# Patient Record
Sex: MALE | Race: WHITE | Employment: OTHER | URBAN - METROPOLITAN AREA
[De-identification: names, ages, dates, MRNs, and addresses within clinical notes are randomized per-mention and may not be internally consistent; named-entity substitution may affect disease eponyms.]

---

## 2016-11-07 LAB
CREATININE, URINE: NORMAL
MICROALBUMIN/CREAT 24H UR: 1.1 MG/G{CREAT}
MICROALBUMIN/CREAT UR-RTO: NORMAL

## 2017-04-04 PROBLEM — I95.1 ORTHOSTATIC HYPOTENSION: Status: ACTIVE | Noted: 2017-04-04

## 2017-07-03 PROBLEM — J45.30 MILD PERSISTENT ASTHMA WITHOUT COMPLICATION: Status: ACTIVE | Noted: 2017-07-03

## 2017-12-21 ENCOUNTER — HOSPITAL ENCOUNTER (OUTPATIENT)
Age: 77
Discharge: HOME | End: 2017-12-21
Payer: MEDICARE

## 2017-12-27 ENCOUNTER — TELEPHONE (OUTPATIENT)
Dept: UROLOGY | Age: 77
End: 2017-12-27

## 2018-01-15 ENCOUNTER — HOSPITAL ENCOUNTER (OUTPATIENT)
Age: 78
Discharge: HOME | End: 2018-01-15
Payer: MEDICARE

## 2018-03-27 ENCOUNTER — OFFICE VISIT (OUTPATIENT)
Dept: UROLOGY | Age: 78
End: 2018-03-27
Payer: MEDICARE

## 2018-03-27 VITALS
BODY MASS INDEX: 34.4 KG/M2 | HEART RATE: 71 BPM | SYSTOLIC BLOOD PRESSURE: 131 MMHG | DIASTOLIC BLOOD PRESSURE: 82 MMHG | WEIGHT: 227 LBS | HEIGHT: 68 IN | TEMPERATURE: 97.5 F

## 2018-03-27 DIAGNOSIS — N13.8 BPH WITH OBSTRUCTION/LOWER URINARY TRACT SYMPTOMS: ICD-10-CM

## 2018-03-27 DIAGNOSIS — R97.20 ELEVATED PSA: Primary | ICD-10-CM

## 2018-03-27 DIAGNOSIS — N40.1 BPH WITH OBSTRUCTION/LOWER URINARY TRACT SYMPTOMS: ICD-10-CM

## 2018-03-27 PROCEDURE — G8482 FLU IMMUNIZE ORDER/ADMIN: HCPCS | Performed by: UROLOGY

## 2018-03-27 PROCEDURE — 99204 OFFICE O/P NEW MOD 45 MIN: CPT | Performed by: UROLOGY

## 2018-03-27 PROCEDURE — 1036F TOBACCO NON-USER: CPT | Performed by: UROLOGY

## 2018-03-27 PROCEDURE — 4040F PNEUMOC VAC/ADMIN/RCVD: CPT | Performed by: UROLOGY

## 2018-03-27 PROCEDURE — G8598 ASA/ANTIPLAT THER USED: HCPCS | Performed by: UROLOGY

## 2018-03-27 PROCEDURE — G8417 CALC BMI ABV UP PARAM F/U: HCPCS | Performed by: UROLOGY

## 2018-03-27 PROCEDURE — G8427 DOCREV CUR MEDS BY ELIG CLIN: HCPCS | Performed by: UROLOGY

## 2018-03-27 PROCEDURE — 1123F ACP DISCUSS/DSCN MKR DOCD: CPT | Performed by: UROLOGY

## 2018-03-27 RX ORDER — TAMSULOSIN HYDROCHLORIDE 0.4 MG/1
CAPSULE ORAL
Qty: 90 CAPSULE | Refills: 3 | Status: SHIPPED | OUTPATIENT
Start: 2018-03-27 | End: 2019-03-24 | Stop reason: SDUPTHER

## 2018-03-27 ASSESSMENT — ENCOUNTER SYMPTOMS
NAUSEA: 0
EYE PAIN: 0
BACK PAIN: 0
COUGH: 1
ABDOMINAL PAIN: 0
VOMITING: 0
DOUBLE VISION: 0
WHEEZING: 0
BLURRED VISION: 0
EYE REDNESS: 0
SHORTNESS OF BREATH: 1

## 2018-03-27 NOTE — PROGRESS NOTES
[facility]  [facility] - [place]  [address]  [address]  [address] [place] [id number]  Dept: [phone]  Dept Fax: [phone]    [facility] Urology Office Note - New patient    Patient:  Yue Davenport  YOB: 1940  Date: 3/27/2018    The patient is a 69 y.o. male who presents today for evaluation of the following problems:   Chief Complaint   Patient presents with    Benign Prostatic Hypertrophy     NP - ref by PCP for bph; pt denies any urologic signs and symptoms     referred by MICHEAL Licea.      HPI  Here for elevated PSA and BPH. He had an elevated PSA several years ago of 9, and had a negative biopsy. PSA came down to 3.98. He had an episode of retention a few years ago, and has been on Flomax and feels like he's doing well. He recently was found to have an elevated PSA of 4.44. No hematuria, no dysuria. No previous prostate infections. He tolerates the Flomax well. (Patient's old records have been requested, reviewed and summarized in today's note.)    Summary of old records: N/A    Additional History: N/A    Procedures Today: N/A    Last several PSA's:  No results found for: PSA  Last total testosterone:  No results found for: TESTOSTERONE  Urinalysis today:  No results found for this visit on 03/27/18. AUA Symptom Score (3/27/2018):   INCOMPLETE EMPTYING: How often have you had the sensation of not emptying your bladder?: Not at all  FREQUENCY: How often do you have to urinate less than every two hours?: Less than 1 to 5 times  INTERMITTENCY: How often have you found you stopped and started again several times when you urinated?: Not at all  URGENCY: How often have you found it difficult to postpone urination?: Not at all  WEAK STREAM: How often have you had a weak urinary stream?: Not at all  STRAINING: How often have you had to strain to start  urination?: Not at all  NOCTURIA: How many times did you typically get up at night to uriniate?: 1

## 2018-03-27 NOTE — PROGRESS NOTES
Review of Systems   Constitutional: Negative for chills, fever and malaise/fatigue. HENT: Positive for hearing loss. Negative for ear pain. Eyes: Negative for blurred vision, double vision, pain and redness. Respiratory: Positive for cough and shortness of breath. Negative for wheezing. Cardiovascular: Negative for chest pain, palpitations and leg swelling. Gastrointestinal: Negative for abdominal pain, nausea and vomiting. Genitourinary: Negative for dysuria, flank pain, frequency, hematuria and urgency. Musculoskeletal: Negative for back pain, falls, myalgias and neck pain. Skin: Negative for itching and rash. Neurological: Positive for headaches. Negative for dizziness and tremors. Endo/Heme/Allergies: Bruises/bleeds easily. Psychiatric/Behavioral: Positive for memory loss. The patient is nervous/anxious.

## 2018-06-21 PROBLEM — F32.5 MAJOR DEPRESSIVE DISORDER WITH SINGLE EPISODE, IN FULL REMISSION (HCC): Status: ACTIVE | Noted: 2018-06-21

## 2018-06-21 PROBLEM — Z99.89 OSA ON CPAP: Status: ACTIVE | Noted: 2018-06-21

## 2018-06-21 PROBLEM — G47.33 OSA ON CPAP: Status: ACTIVE | Noted: 2018-06-21

## 2018-12-26 ENCOUNTER — TELEPHONE (OUTPATIENT)
Dept: PRIMARY CARE CLINIC | Age: 78
End: 2018-12-26

## 2018-12-28 RX ORDER — FLUTICASONE FUROATE AND VILANTEROL 200; 25 UG/1; UG/1
1 POWDER RESPIRATORY (INHALATION) DAILY
COMMUNITY
End: 2019-01-04 | Stop reason: ALTCHOICE

## 2019-01-04 RX ORDER — LEVOTHYROXINE SODIUM 137 UG/1
137 TABLET ORAL DAILY
Qty: 90 TABLET | Refills: 1 | Status: SHIPPED | OUTPATIENT
Start: 2019-01-04 | End: 2019-07-17 | Stop reason: SDUPTHER

## 2019-01-04 RX ORDER — OMEPRAZOLE 40 MG/1
40 CAPSULE, DELAYED RELEASE ORAL DAILY
Qty: 90 CAPSULE | Refills: 1 | Status: SHIPPED | OUTPATIENT
Start: 2019-01-04 | End: 2019-08-20 | Stop reason: ALTCHOICE

## 2019-03-24 DIAGNOSIS — N40.1 BPH WITH OBSTRUCTION/LOWER URINARY TRACT SYMPTOMS: ICD-10-CM

## 2019-03-24 DIAGNOSIS — N13.8 BPH WITH OBSTRUCTION/LOWER URINARY TRACT SYMPTOMS: ICD-10-CM

## 2019-03-26 RX ORDER — TAMSULOSIN HYDROCHLORIDE 0.4 MG/1
CAPSULE ORAL
Qty: 90 CAPSULE | Refills: 2 | Status: SHIPPED | OUTPATIENT
Start: 2019-03-26 | End: 2020-01-28 | Stop reason: SDUPTHER

## 2019-03-28 ENCOUNTER — TELEPHONE (OUTPATIENT)
Dept: UROLOGY | Age: 79
End: 2019-03-28

## 2019-04-12 ENCOUNTER — TELEPHONE (OUTPATIENT)
Dept: PRIMARY CARE CLINIC | Age: 79
End: 2019-04-12

## 2019-04-12 RX ORDER — FLUTICASONE FUROATE AND VILANTEROL 200; 25 UG/1; UG/1
200 POWDER RESPIRATORY (INHALATION) ONCE
Qty: 1 EACH | Refills: 0 | COMMUNITY
Start: 2019-04-12 | End: 2019-04-12

## 2019-04-12 NOTE — TELEPHONE ENCOUNTER
Patient's daughter Cassie Gross called and asked for a Breo 200mcg/25mcg. Patient's grandson Ruba Young will  sample today. Patient's daughter scheduled appointment for patient as well.

## 2019-05-10 DIAGNOSIS — G89.29 CHRONIC NONINTRACTABLE HEADACHE, UNSPECIFIED HEADACHE TYPE: ICD-10-CM

## 2019-05-10 DIAGNOSIS — R51.9 CHRONIC NONINTRACTABLE HEADACHE, UNSPECIFIED HEADACHE TYPE: ICD-10-CM

## 2019-05-10 RX ORDER — BUTALBITAL, ACETAMINOPHEN, CAFFEINE AND CODEINE PHOSPHATE 50; 325; 40; 30 MG/1; MG/1; MG/1; MG/1
1 CAPSULE ORAL EVERY 8 HOURS PRN
Qty: 60 CAPSULE | Refills: 1 | Status: SHIPPED | OUTPATIENT
Start: 2019-05-10 | End: 2019-10-27 | Stop reason: SDUPTHER

## 2019-05-10 NOTE — TELEPHONE ENCOUNTER
Last appt 6/21/18. Sofy's pt. Has appt coming up on 6/18/19 with Damian Tony. Asking for enough just to hold him until his appt.

## 2019-06-28 ENCOUNTER — OFFICE VISIT (OUTPATIENT)
Dept: PRIMARY CARE CLINIC | Age: 79
End: 2019-06-28
Payer: MEDICARE

## 2019-06-28 ENCOUNTER — HOSPITAL ENCOUNTER (OUTPATIENT)
Age: 79
Setting detail: SPECIMEN
Discharge: HOME | End: 2019-06-28
Payer: MEDICARE

## 2019-06-28 VITALS
DIASTOLIC BLOOD PRESSURE: 78 MMHG | SYSTOLIC BLOOD PRESSURE: 112 MMHG | OXYGEN SATURATION: 94 % | BODY MASS INDEX: 35.35 KG/M2 | HEART RATE: 58 BPM | WEIGHT: 231.8 LBS

## 2019-06-28 DIAGNOSIS — I10 BENIGN ESSENTIAL HYPERTENSION: ICD-10-CM

## 2019-06-28 DIAGNOSIS — I10 BENIGN ESSENTIAL HYPERTENSION: Primary | ICD-10-CM

## 2019-06-28 DIAGNOSIS — E03.9 HYPOTHYROIDISM, UNSPECIFIED TYPE: ICD-10-CM

## 2019-06-28 LAB
ANION GAP SERPL CALCULATED.3IONS-SCNC: 10 MMOL/L (ref 9–17)
BUN BLDV-MCNC: 14 MG/DL (ref 8–23)
BUN/CREAT BLD: ABNORMAL (ref 9–20)
CALCIUM SERPL-MCNC: 9.4 MG/DL (ref 8.6–10.4)
CHLORIDE BLD-SCNC: 101 MMOL/L (ref 98–107)
CO2: 27 MMOL/L (ref 20–31)
CREAT SERPL-MCNC: 0.9 MG/DL (ref 0.7–1.2)
GFR AFRICAN AMERICAN: >60 ML/MIN
GFR NON-AFRICAN AMERICAN: >60 ML/MIN
GFR SERPL CREATININE-BSD FRML MDRD: ABNORMAL ML/MIN/{1.73_M2}
GFR SERPL CREATININE-BSD FRML MDRD: ABNORMAL ML/MIN/{1.73_M2}
GLUCOSE BLD-MCNC: 136 MG/DL (ref 70–99)
POTASSIUM SERPL-SCNC: 4.6 MMOL/L (ref 3.7–5.3)
SODIUM BLD-SCNC: 138 MMOL/L (ref 135–144)
TSH SERPL DL<=0.05 MIU/L-ACNC: 2.25 MIU/L (ref 0.3–5)

## 2019-06-28 PROCEDURE — 1036F TOBACCO NON-USER: CPT | Performed by: PHYSICIAN ASSISTANT

## 2019-06-28 PROCEDURE — G8599 NO ASA/ANTIPLAT THER USE RNG: HCPCS | Performed by: PHYSICIAN ASSISTANT

## 2019-06-28 PROCEDURE — G8427 DOCREV CUR MEDS BY ELIG CLIN: HCPCS | Performed by: PHYSICIAN ASSISTANT

## 2019-06-28 PROCEDURE — 1123F ACP DISCUSS/DSCN MKR DOCD: CPT | Performed by: PHYSICIAN ASSISTANT

## 2019-06-28 PROCEDURE — 99214 OFFICE O/P EST MOD 30 MIN: CPT | Performed by: PHYSICIAN ASSISTANT

## 2019-06-28 PROCEDURE — G8417 CALC BMI ABV UP PARAM F/U: HCPCS | Performed by: PHYSICIAN ASSISTANT

## 2019-06-28 PROCEDURE — 4040F PNEUMOC VAC/ADMIN/RCVD: CPT | Performed by: PHYSICIAN ASSISTANT

## 2019-06-28 RX ORDER — SERTRALINE HYDROCHLORIDE 100 MG/1
TABLET, FILM COATED ORAL
Qty: 90 TABLET | Refills: 1 | Status: SHIPPED | OUTPATIENT
Start: 2019-06-28 | End: 2019-12-02 | Stop reason: SDUPTHER

## 2019-06-28 RX ORDER — CARVEDILOL 6.25 MG/1
1 TABLET ORAL 2 TIMES DAILY
Refills: 3 | COMMUNITY
Start: 2019-05-29

## 2019-06-28 RX ORDER — MIDODRINE HYDROCHLORIDE 5 MG/1
TABLET ORAL
Qty: 270 TABLET | Refills: 1 | Status: SHIPPED | OUTPATIENT
Start: 2019-06-28 | End: 2019-08-20 | Stop reason: ALTCHOICE

## 2019-06-28 RX ORDER — OMEPRAZOLE 40 MG/1
40 CAPSULE, DELAYED RELEASE ORAL DAILY
Qty: 90 CAPSULE | Refills: 1 | Status: CANCELLED | OUTPATIENT
Start: 2019-06-28

## 2019-06-28 ASSESSMENT — ENCOUNTER SYMPTOMS
VOICE CHANGE: 0
CONSTIPATION: 0
NAUSEA: 0
SHORTNESS OF BREATH: 0
DIARRHEA: 0
COUGH: 0
TROUBLE SWALLOWING: 0
BACK PAIN: 0
BLOOD IN STOOL: 0
CHEST TIGHTNESS: 0
WHEEZING: 0
EYE PAIN: 0
VOMITING: 0
ABDOMINAL PAIN: 0

## 2019-06-28 NOTE — PROGRESS NOTES
[address] [facility] PR[facility]  [id number] [facility]  Dept: [phone]    Ramy Green is a 67 y.o. male who presents today for his medical conditions/complaintsas noted below. Chief Complaint   Patient presents with    Medication Check     general check up. needs refills. feeling well. no concerns. HPI:     HPI  CPAP machine only 4 hours at night  Feels good except HAs mostly in am  Bowels and bladder no problems, no CP or SOB. Takes his Pippa Rocker once day only. Getting samples from [facility] in place of [facility].     LDL Cholesterol (mg/dL)   Date Value   02/01/2016 160 (H)   01/08/2013 129 (H)     LDL Calculated (mg/dL)   Date Value   11/28/2017 122   11/05/2016 193 (A)       (goal LDL is <100)   AST (U/L)   Date Value   08/24/2016 32     ALT (U/L)   Date Value   08/24/2016 21     BUN (mg/dL)   Date Value   08/30/2016 12     BP Readings from Last 3 Encounters:   06/28/19 112/78   06/21/18 138/80   03/27/18 131/82          (goal 120/80)    Past Medical History:   Diagnosis Date    Depression     Hyperlipidemia     Hypertension     Hypothyroidism       Past Surgical History:   Procedure Laterality Date    CORONARY ANGIOPLASTY WITH STENT PLACEMENT  2013    EYE SURGERY      as a child       Family History   Problem Relation Age of Onset    Cancer Mother         breast    Diabetes Brother     Heart Disease Brother     High Blood Pressure Brother     Diabetes Maternal Uncle     Heart Disease Maternal Uncle     Cancer Other         throat       Social History     Tobacco Use    Smoking status: Never Smoker    Smokeless tobacco: Never Used   Substance Use Topics    Alcohol use: No      Current Outpatient Medications   Medication Sig Dispense Refill    carvedilol (COREG) 6.25 MG tablet Take 1 tablet by mouth daily  3    sertraline (ZOLOFT) 100 MG tablet TAKE ONE TABLET BY MOUTH ONE TIME DAILY 90 tablet 1    midodrine (PROAMATINE) 5 MG tablet TAKE ONE TABLET BY MOUTH THREE TIMES DAILY WITH FOOD 270 tablet 1    butalbital-acetaminophen-caffeine-codeine (FIORICET WITH CODEINE) -38-30 MG per capsule Take 1 capsule by mouth every 8 hours as needed for Headaches for up to 60 days. 60 capsule 1    tamsulosin (FLOMAX) 0.4 MG capsule TAKE 1 CAPSULE EVERY EVENING 90 capsule 2    omeprazole (PRILOSEC) 40 MG delayed release capsule Take 1 capsule by mouth daily 90 capsule 1    levothyroxine (SYNTHROID) 137 MCG tablet Take 1 tablet by mouth Daily 90 tablet 1    mometasone-formoterol (DULERA) 100-5 MCG/ACT inhaler Inhale 2 puffs into the lungs 2 times daily 3 Inhaler 1    Omega-3 Fatty Acids (FISH OIL OMEGA-3 PO) Take by mouth      amLODIPine (NORVASC) 5 MG tablet Take 1 tablet by mouth daily (Patient taking differently: Take 5 mg by mouth 2 times daily ) 90 tablet 1    clopidogrel (PLAVIX) 75 MG tablet Take 1 tablet by mouth daily 90 tablet 1    losartan (COZAAR) 100 MG tablet Take 1 tablet by mouth 2 times daily 180 tablet 1    hydrochlorothiazide (MICROZIDE) 12.5 MG capsule Take 1 capsule by mouth as needed (edema) 30 capsule 5    vitamin B-12 (CYANOCOBALAMIN) 1000 MCG tablet Take 1 tablet by mouth daily 90 tablet 1    vitamin D (CHOLECALCIFEROL) 1000 UNIT TABS tablet Take 1 tablet by mouth daily 30 tablet 11    Multiple Vitamins-Minerals (ONE DAILY MENS) TABS Take 1 tablet by mouth daily       No current facility-administered medications for this visit.       Allergies   Allergen Reactions    Amiodarone Other (See Comments)     hallucinations       Health Maintenance   Topic Date Due    DTaP/Tdap/Td vaccine (1 - Tdap) 03/08/1960    Shingles Vaccine (2 of 3) 12/27/2012    TSH testing  11/28/2018    Potassium monitoring  11/28/2018    Creatinine monitoring  11/28/2018    Annual Wellness Visit (AWV)  06/21/2019    Flu vaccine (Season Ended) 09/01/2019    Pneumococcal 65+ years Vaccine  Completed       Subjective:      Review of Systems Constitutional: Negative for activity change, appetite change, chills, fatigue, fever and unexpected weight change. HENT: Negative for dental problem, hearing loss, trouble swallowing and voice change. Eyes: Negative for pain and visual disturbance. Respiratory: Negative for cough, chest tightness, shortness of breath and wheezing. Cardiovascular: Negative for chest pain, palpitations and leg swelling. Gastrointestinal: Negative for abdominal pain, blood in stool, constipation, diarrhea, nausea and vomiting. Endocrine: Negative for cold intolerance, heat intolerance, polydipsia, polyphagia and polyuria. Genitourinary: Negative for discharge, dysuria, flank pain, frequency, hematuria, penile pain, penile swelling, scrotal swelling, testicular pain and urgency. Musculoskeletal: Negative for arthralgias, back pain, joint swelling and myalgias. Neurological: Positive for headaches. Negative for dizziness, syncope, speech difficulty and light-headedness. Hematological: Negative for adenopathy. Does not bruise/bleed easily. Psychiatric/Behavioral: Negative for dysphoric mood and sleep disturbance. The patient is not nervous/anxious. Objective:     /78   Pulse 58   Wt 231 lb 12.8 oz (105.1 kg)   SpO2 94%   BMI 35.35 kg/m²   Physical Exam   Constitutional: He is oriented to person, place, and time. He appears well-developed and well-nourished. HENT:   Right Ear: External ear normal.   Left Ear: External ear normal.   Nose: Nose normal.   Mouth/Throat: Oropharynx is clear and moist. No oropharyngeal exudate. Eyes: Pupils are equal, round, and reactive to light. Conjunctivae are normal. Right eye exhibits no discharge. Left eye exhibits no discharge. Neck: Normal range of motion. No thyromegaly present. Cardiovascular: Normal rate, regular rhythm and normal heart sounds. Exam reveals no gallop and no friction rub. No murmur heard.   Pulmonary/Chest: Effort normal and breath sounds normal. No respiratory distress. He has no wheezes. He has no rales. He exhibits no tenderness. Abdominal: Soft. Bowel sounds are normal. He exhibits no distension and no mass. There is no tenderness. There is no rebound and no guarding. No hernia. Musculoskeletal: Normal range of motion. He exhibits no edema. Neurological: He is alert and oriented to person, place, and time. He displays normal reflexes. He exhibits normal muscle tone. Coordination normal.   Skin: Skin is warm. Capillary refill takes less than 2 seconds. No rash noted. Psychiatric: He has a normal mood and affect. Vitals reviewed. Assessment:       Diagnosis Orders   1. Benign essential hypertension  Basic Metabolic Panel   2. Hypothyroidism, unspecified type  TSH        Plan:    Call back on the Breo dose which he takes in place of [place]. Call back if he is using the HCTZ  Have BW here today  Refills sent    Return in about 6 months (around 12/28/2019) for recheck --1/2 hour and AWV. Orders Placed This Encounter   Procedures    Basic Metabolic Panel     Standing Status:   Future     Number of Occurrences:   1     Standing Expiration Date:   6/28/2020    TSH     Standing Status:   Future     Number of Occurrences:   1     Standing Expiration Date:   6/28/2020     Orders Placed This Encounter   Medications    sertraline (ZOLOFT) 100 MG tablet     Sig: TAKE ONE TABLET BY MOUTH ONE TIME DAILY     Dispense:  90 tablet     Refill:  1    midodrine (PROAMATINE) 5 MG tablet     Sig: TAKE ONE TABLET BY MOUTH THREE TIMES DAILY WITH FOOD     Dispense:  270 tablet     Refill:  1       Patient given educationalmaterials - see patient instructions. Discussed use, benefit, and side effectsof prescribed medications. All patient questions answered. Pt voiced understanding. Reviewed health maintenance. Instructed to continue current medications, diet andexercise. Patient agreed with treatment plan. Follow up as directed. Electronicallysigned by Chris Morris PA-C on 6/28/2019 at 2:00 PM

## 2019-07-01 ENCOUNTER — TELEPHONE (OUTPATIENT)
Dept: PRIMARY CARE CLINIC | Age: 79
End: 2019-07-01

## 2019-07-01 DIAGNOSIS — R73.01 ELEVATED FASTING GLUCOSE: Primary | ICD-10-CM

## 2019-07-01 RX ORDER — FLUTICASONE FUROATE AND VILANTEROL 200; 25 UG/1; UG/1
POWDER RESPIRATORY (INHALATION)
Qty: 1 EACH | Refills: 0 | Status: SHIPPED
Start: 2019-07-01 | End: 2021-06-30 | Stop reason: SDUPTHER

## 2019-07-01 RX ORDER — FLUTICASONE FUROATE AND VILANTEROL 200; 25 UG/1; UG/1
POWDER RESPIRATORY (INHALATION)
Qty: 1 EACH | Refills: 0
Start: 2019-07-01 | End: 2019-07-01 | Stop reason: DRUGHIGH

## 2019-08-12 ENCOUNTER — TELEPHONE (OUTPATIENT)
Dept: PRIMARY CARE CLINIC | Age: 79
End: 2019-08-12

## 2019-08-12 NOTE — TELEPHONE ENCOUNTER
L/m for Jenn to call back regarding when Janel Lamas should get his BW done, he should get them both done now.  He also needs to be fasting for the glucose test.

## 2019-08-20 ENCOUNTER — HOSPITAL ENCOUNTER (OUTPATIENT)
Age: 79
Setting detail: SPECIMEN
Discharge: HOME | End: 2019-08-20
Payer: MEDICARE

## 2019-08-20 ENCOUNTER — OFFICE VISIT (OUTPATIENT)
Dept: PRIMARY CARE CLINIC | Age: 79
End: 2019-08-20
Payer: MEDICARE

## 2019-08-20 VITALS
BODY MASS INDEX: 35.26 KG/M2 | DIASTOLIC BLOOD PRESSURE: 82 MMHG | HEART RATE: 70 BPM | SYSTOLIC BLOOD PRESSURE: 124 MMHG | OXYGEN SATURATION: 96 % | WEIGHT: 231.2 LBS | TEMPERATURE: 97.9 F

## 2019-08-20 DIAGNOSIS — I10 BENIGN ESSENTIAL HYPERTENSION: ICD-10-CM

## 2019-08-20 DIAGNOSIS — G47.33 OSA ON CPAP: ICD-10-CM

## 2019-08-20 DIAGNOSIS — R73.01 ELEVATED FASTING GLUCOSE: ICD-10-CM

## 2019-08-20 DIAGNOSIS — Z99.89 OSA ON CPAP: ICD-10-CM

## 2019-08-20 DIAGNOSIS — R05.9 COUGH: Primary | ICD-10-CM

## 2019-08-20 DIAGNOSIS — F32.5 MAJOR DEPRESSIVE DISORDER WITH SINGLE EPISODE, IN FULL REMISSION (HCC): ICD-10-CM

## 2019-08-20 DIAGNOSIS — J45.30 MILD PERSISTENT ASTHMA WITHOUT COMPLICATION: ICD-10-CM

## 2019-08-20 LAB — GLUCOSE FASTING: 118 MG/DL (ref 70–99)

## 2019-08-20 PROCEDURE — 99213 OFFICE O/P EST LOW 20 MIN: CPT | Performed by: PHYSICIAN ASSISTANT

## 2019-08-20 PROCEDURE — 4040F PNEUMOC VAC/ADMIN/RCVD: CPT | Performed by: PHYSICIAN ASSISTANT

## 2019-08-20 PROCEDURE — G8599 NO ASA/ANTIPLAT THER USE RNG: HCPCS | Performed by: PHYSICIAN ASSISTANT

## 2019-08-20 PROCEDURE — G8427 DOCREV CUR MEDS BY ELIG CLIN: HCPCS | Performed by: PHYSICIAN ASSISTANT

## 2019-08-20 PROCEDURE — 1036F TOBACCO NON-USER: CPT | Performed by: PHYSICIAN ASSISTANT

## 2019-08-20 PROCEDURE — G8417 CALC BMI ABV UP PARAM F/U: HCPCS | Performed by: PHYSICIAN ASSISTANT

## 2019-08-20 PROCEDURE — 1123F ACP DISCUSS/DSCN MKR DOCD: CPT | Performed by: PHYSICIAN ASSISTANT

## 2019-08-20 RX ORDER — BUTALBITAL, ACETAMINOPHEN, CAFFEINE AND CODEINE PHOSPHATE 50; 325; 40; 30 MG/1; MG/1; MG/1; MG/1
CAPSULE ORAL
Refills: 1 | COMMUNITY
Start: 2019-07-31 | End: 2019-10-28 | Stop reason: SDUPTHER

## 2019-08-20 ASSESSMENT — ENCOUNTER SYMPTOMS
COUGH: 1
SORE THROAT: 0

## 2019-08-21 DIAGNOSIS — R05.9 COUGH: ICD-10-CM

## 2019-08-21 LAB
ESTIMATED AVERAGE GLUCOSE: 137 MG/DL
HBA1C MFR BLD: 6.4 % (ref 4–6)

## 2019-08-30 DIAGNOSIS — F43.21 GRIEF REACTION: Primary | ICD-10-CM

## 2019-08-30 DIAGNOSIS — F41.9 ANXIETY: ICD-10-CM

## 2019-08-30 RX ORDER — ALPRAZOLAM 0.5 MG/1
TABLET ORAL
Qty: 15 TABLET | Refills: 0 | Status: SHIPPED | OUTPATIENT
Start: 2019-08-30 | End: 2019-09-30

## 2019-09-11 ENCOUNTER — OFFICE VISIT (OUTPATIENT)
Dept: PRIMARY CARE CLINIC | Age: 79
End: 2019-09-11
Payer: MEDICARE

## 2019-09-11 VITALS
BODY MASS INDEX: 35.38 KG/M2 | OXYGEN SATURATION: 96 % | DIASTOLIC BLOOD PRESSURE: 84 MMHG | WEIGHT: 232 LBS | HEART RATE: 71 BPM | SYSTOLIC BLOOD PRESSURE: 134 MMHG

## 2019-09-11 DIAGNOSIS — I10 BENIGN ESSENTIAL HYPERTENSION: ICD-10-CM

## 2019-09-11 DIAGNOSIS — I25.10 CORONARY ARTERY DISEASE INVOLVING NATIVE HEART WITHOUT ANGINA PECTORIS, UNSPECIFIED VESSEL OR LESION TYPE: ICD-10-CM

## 2019-09-11 DIAGNOSIS — E11.9 TYPE 2 DIABETES MELLITUS WITHOUT COMPLICATION, WITHOUT LONG-TERM CURRENT USE OF INSULIN (HCC): Primary | ICD-10-CM

## 2019-09-11 DIAGNOSIS — R20.0 LOSS OF FEELING OR SENSATION: ICD-10-CM

## 2019-09-11 LAB
CREATININE URINE POCT: 300
MICROALBUMIN/CREAT 24H UR: 30 MG/G{CREAT}
MICROALBUMIN/CREAT UR-RTO: <30

## 2019-09-11 PROCEDURE — G8599 NO ASA/ANTIPLAT THER USE RNG: HCPCS | Performed by: PHYSICIAN ASSISTANT

## 2019-09-11 PROCEDURE — G8417 CALC BMI ABV UP PARAM F/U: HCPCS | Performed by: PHYSICIAN ASSISTANT

## 2019-09-11 PROCEDURE — 4040F PNEUMOC VAC/ADMIN/RCVD: CPT | Performed by: PHYSICIAN ASSISTANT

## 2019-09-11 PROCEDURE — 1123F ACP DISCUSS/DSCN MKR DOCD: CPT | Performed by: PHYSICIAN ASSISTANT

## 2019-09-11 PROCEDURE — 82044 UR ALBUMIN SEMIQUANTITATIVE: CPT | Performed by: PHYSICIAN ASSISTANT

## 2019-09-11 PROCEDURE — 99214 OFFICE O/P EST MOD 30 MIN: CPT | Performed by: PHYSICIAN ASSISTANT

## 2019-09-11 PROCEDURE — 1036F TOBACCO NON-USER: CPT | Performed by: PHYSICIAN ASSISTANT

## 2019-09-11 PROCEDURE — G8427 DOCREV CUR MEDS BY ELIG CLIN: HCPCS | Performed by: PHYSICIAN ASSISTANT

## 2019-09-11 RX ORDER — ATORVASTATIN CALCIUM 40 MG/1
40 TABLET, FILM COATED ORAL DAILY
Qty: 30 TABLET | Refills: 1 | Status: SHIPPED | OUTPATIENT
Start: 2019-09-11 | End: 2019-12-02 | Stop reason: SDUPTHER

## 2019-09-11 RX ORDER — BLOOD PRESSURE TEST KIT
KIT MISCELLANEOUS
Qty: 100 EACH | Refills: 11 | Status: SHIPPED | OUTPATIENT
Start: 2019-09-11

## 2019-09-11 RX ORDER — LANCETS 30 GAUGE
EACH MISCELLANEOUS
Qty: 100 EACH | Refills: 11 | Status: SHIPPED | OUTPATIENT
Start: 2019-09-11 | End: 2021-06-30 | Stop reason: ALTCHOICE

## 2019-09-11 RX ORDER — FLASH GLUCOSE SENSOR
KIT MISCELLANEOUS
Qty: 6 EACH | Refills: 3 | Status: SHIPPED | OUTPATIENT
Start: 2019-09-11 | End: 2021-03-29

## 2019-09-11 RX ORDER — FLASH GLUCOSE SCANNING READER
EACH MISCELLANEOUS
Qty: 1 DEVICE | Refills: 0 | Status: SHIPPED | OUTPATIENT
Start: 2019-09-11 | End: 2021-06-30 | Stop reason: SDUPTHER

## 2019-09-11 ASSESSMENT — ENCOUNTER SYMPTOMS
SHORTNESS OF BREATH: 0
CHEST TIGHTNESS: 0
NAUSEA: 0
COUGH: 0
SORE THROAT: 0
EYE DISCHARGE: 0
VOMITING: 0
EYE PAIN: 0

## 2019-09-11 NOTE — PROGRESS NOTES
Potassium monitoring  06/28/2020    Creatinine monitoring  06/28/2020    Pneumococcal 65+ years Vaccine  Completed       Subjective:      Review of Systems   Constitutional: Positive for fatigue. Negative for activity change, appetite change and fever. HENT: Negative for congestion and sore throat. Eyes: Negative for pain, discharge and visual disturbance. Respiratory: Negative for cough, chest tightness and shortness of breath. Cardiovascular: Negative for chest pain and palpitations. Gastrointestinal: Negative for nausea and vomiting. Endocrine: Negative for polydipsia and polyphagia. Neurological: Negative for dizziness and numbness. Psychiatric/Behavioral: Negative for sleep disturbance. Objective:     /84   Pulse 71   Wt 232 lb (105.2 kg)   SpO2 96%   BMI 35.38 kg/m²   Physical Exam   Constitutional: He is oriented to person, place, and time. Neck: No thyromegaly present. Cardiovascular: Normal rate, regular rhythm and normal heart sounds. Pulses:       Dorsalis pedis pulses are 2+ on the right side, and 2+ on the left side. Pulmonary/Chest: Effort normal and breath sounds normal.   Musculoskeletal: He exhibits no edema. Right foot: There is normal range of motion and no deformity. Left foot: There is normal range of motion and no deformity. Feet:   Right Foot:   Protective Sensation: 7 sites tested. 5 sites sensed. Skin Integrity: Negative for ulcer, blister, skin breakdown, erythema, warmth or callus. Left Foot:   Protective Sensation: 7 sites tested. 4 sites sensed. Skin Integrity: Negative for ulcer, blister, skin breakdown, erythema, warmth, callus or dry skin. Neurological: He is alert and oriented to person, place, and time. Psychiatric: He has a normal mood and affect. Vitals reviewed. Assessment:       Diagnosis Orders   1.  Type 2 diabetes mellitus without complication, without long-term current use of insulin ([facility])  POCT For any brand insurance will cover. TEST BID     Dispense:  100 each     Refill:  11    Blood Glucose Monitoring Suppl MISC     Sig: For any brand insurance will cover. GLUCOMETER. TEST BID. Dispense:  1 each     Refill:  0    blood glucose test strips (ASCENSIA AUTODISC VI;ONE TOUCH ULTRA TEST VI) strip     Sig: For any brand insurance will cover. TEST BID     Dispense:  100 strip     Refill:  11    atorvastatin (LIPITOR) 40 MG tablet     Sig: Take 1 tablet by mouth daily     Dispense:  30 tablet     Refill:  1    Continuous Blood Gluc  (FREESTYLE MARSHAL 14 DAY READER) [facility]     Sig: Testing BID. Dispense:  1 Device     Refill:  0    Continuous Blood Gluc Sensor (FREESTYLE MARSHAL 14 DAY SENSOR) [facility]     Sig: Apply 1 sensor to the upper arm every 14 days. Dispense:  6 each     Refill:  3       Patient given educationalmaterials - see patient instructions. Discussed use, benefit, and side effectsof prescribed medications. All patient questions answered. Pt voiced understanding. Reviewed health maintenance. Instructed to continue current medications, diet andexercise. Patient agreed with treatment plan. Follow up as directed.      Electronicallysigned by Vanessa Jones PA-C on 9/11/2019 at 1:48 PM

## 2019-10-27 DIAGNOSIS — G89.29 CHRONIC NONINTRACTABLE HEADACHE, UNSPECIFIED HEADACHE TYPE: ICD-10-CM

## 2019-10-27 DIAGNOSIS — R51.9 CHRONIC NONINTRACTABLE HEADACHE, UNSPECIFIED HEADACHE TYPE: ICD-10-CM

## 2019-10-28 RX ORDER — BUTALBITAL, ACETAMINOPHEN, CAFFEINE AND CODEINE PHOSPHATE 50; 325; 40; 30 MG/1; MG/1; MG/1; MG/1
CAPSULE ORAL
Qty: 60 CAPSULE | Refills: 0 | Status: SHIPPED | OUTPATIENT
Start: 2019-10-28 | End: 2020-01-30

## 2019-12-02 DIAGNOSIS — E11.9 TYPE 2 DIABETES MELLITUS WITHOUT COMPLICATION, WITHOUT LONG-TERM CURRENT USE OF INSULIN (HCC): ICD-10-CM

## 2019-12-02 DIAGNOSIS — I25.10 CORONARY ARTERY DISEASE INVOLVING NATIVE HEART WITHOUT ANGINA PECTORIS, UNSPECIFIED VESSEL OR LESION TYPE: ICD-10-CM

## 2019-12-03 ENCOUNTER — TELEPHONE (OUTPATIENT)
Dept: UROLOGY | Age: 79
End: 2019-12-03

## 2019-12-04 RX ORDER — ATORVASTATIN CALCIUM 40 MG/1
TABLET, FILM COATED ORAL
Qty: 30 TABLET | Refills: 0 | Status: SHIPPED | OUTPATIENT
Start: 2019-12-04 | End: 2020-01-06

## 2019-12-04 RX ORDER — SERTRALINE HYDROCHLORIDE 100 MG/1
TABLET, FILM COATED ORAL
Qty: 90 TABLET | Refills: 0 | Status: SHIPPED | OUTPATIENT
Start: 2019-12-04 | End: 2020-03-09

## 2019-12-11 ENCOUNTER — TELEPHONE (OUTPATIENT)
Dept: PRIMARY CARE CLINIC | Age: 79
End: 2019-12-11

## 2020-01-30 RX ORDER — BUTALBITAL, ACETAMINOPHEN, CAFFEINE AND CODEINE PHOSPHATE 50; 325; 40; 30 MG/1; MG/1; MG/1; MG/1
CAPSULE ORAL
Qty: 60 CAPSULE | Refills: 0 | Status: SHIPPED | OUTPATIENT
Start: 2020-01-30 | End: 2020-04-17

## 2020-01-30 RX ORDER — ATORVASTATIN CALCIUM 40 MG/1
TABLET, FILM COATED ORAL
Qty: 30 TABLET | Refills: 0 | Status: SHIPPED | OUTPATIENT
Start: 2020-01-30 | End: 2020-02-03

## 2020-04-17 RX ORDER — BUTALBITAL, ACETAMINOPHEN, CAFFEINE AND CODEINE PHOSPHATE 50; 325; 40; 30 MG/1; MG/1; MG/1; MG/1
CAPSULE ORAL
Qty: 60 CAPSULE | Refills: 0 | Status: SHIPPED | OUTPATIENT
Start: 2020-04-17 | End: 2020-09-08

## 2020-05-06 ENCOUNTER — TELEPHONE (OUTPATIENT)
Dept: PRIMARY CARE CLINIC | Age: 80
End: 2020-05-06

## 2020-06-25 ENCOUNTER — TELEPHONE (OUTPATIENT)
Dept: PRIMARY CARE CLINIC | Age: 80
End: 2020-06-25

## 2020-06-25 RX ORDER — ACETAMINOPHEN AND CODEINE PHOSPHATE 300; 30 MG/1; MG/1
1 TABLET ORAL EVERY 4 HOURS PRN
Qty: 30 TABLET | Refills: 0 | Status: SHIPPED | OUTPATIENT
Start: 2020-06-25 | End: 2020-06-26 | Stop reason: SDUPTHER

## 2020-06-26 RX ORDER — ACETAMINOPHEN AND CODEINE PHOSPHATE 300; 30 MG/1; MG/1
1 TABLET ORAL EVERY 4 HOURS PRN
Qty: 30 TABLET | Refills: 0 | Status: SHIPPED | OUTPATIENT
Start: 2020-06-26 | End: 2020-07-01

## 2020-09-08 RX ORDER — BUTALBITAL, ACETAMINOPHEN, CAFFEINE AND CODEINE PHOSPHATE 50; 325; 40; 30 MG/1; MG/1; MG/1; MG/1
CAPSULE ORAL
Qty: 60 CAPSULE | Refills: 0 | Status: SHIPPED | OUTPATIENT
Start: 2020-09-08 | End: 2020-09-21 | Stop reason: SDUPTHER

## 2020-12-16 ENCOUNTER — HOSPITAL ENCOUNTER (OUTPATIENT)
Age: 80
Setting detail: SPECIMEN
Discharge: HOME | End: 2020-12-16
Payer: MEDICARE

## 2020-12-16 LAB
ALT SERPL-CCNC: 17 U/L (ref 5–41)
AST SERPL-CCNC: 19 U/L
CHOLESTEROL/HDL RATIO: 6.2
CHOLESTEROL: 185 MG/DL
HDLC SERPL-MCNC: 30 MG/DL
LDL CHOLESTEROL: 120 MG/DL (ref 0–130)
TRIGL SERPL-MCNC: 174 MG/DL
VLDLC SERPL CALC-MCNC: ABNORMAL MG/DL (ref 1–30)

## 2021-01-18 RX ORDER — SERTRALINE HYDROCHLORIDE 100 MG/1
TABLET, FILM COATED ORAL
Qty: 30 TABLET | Refills: 0 | Status: SHIPPED | OUTPATIENT
Start: 2021-01-18 | End: 2021-02-16

## 2021-04-06 DIAGNOSIS — N40.1 BPH WITH OBSTRUCTION/LOWER URINARY TRACT SYMPTOMS: ICD-10-CM

## 2021-04-06 DIAGNOSIS — N13.8 BPH WITH OBSTRUCTION/LOWER URINARY TRACT SYMPTOMS: ICD-10-CM

## 2021-04-06 RX ORDER — TAMSULOSIN HYDROCHLORIDE 0.4 MG/1
CAPSULE ORAL
Qty: 90 CAPSULE | Refills: 0 | Status: SHIPPED | OUTPATIENT
Start: 2021-04-06 | End: 2021-06-30 | Stop reason: SDUPTHER

## 2021-04-06 RX ORDER — SERTRALINE HYDROCHLORIDE 100 MG/1
TABLET, FILM COATED ORAL
Qty: 90 TABLET | Refills: 0 | Status: SHIPPED | OUTPATIENT
Start: 2021-04-06 | End: 2021-06-30 | Stop reason: SDUPTHER

## 2021-04-06 RX ORDER — LEVOTHYROXINE SODIUM 137 UG/1
TABLET ORAL
Qty: 90 TABLET | Refills: 0 | Status: SHIPPED | OUTPATIENT
Start: 2021-04-06 | End: 2021-06-30 | Stop reason: SDUPTHER

## 2021-04-28 DIAGNOSIS — E11.9 TYPE 2 DIABETES MELLITUS WITHOUT COMPLICATION, WITHOUT LONG-TERM CURRENT USE OF INSULIN (HCC): ICD-10-CM

## 2021-04-29 RX ORDER — FLASH GLUCOSE SENSOR
KIT MISCELLANEOUS
Qty: 2 EACH | Refills: 5 | Status: SHIPPED | OUTPATIENT
Start: 2021-04-29 | End: 2021-05-28

## 2021-05-02 DIAGNOSIS — G89.29 CHRONIC NONINTRACTABLE HEADACHE, UNSPECIFIED HEADACHE TYPE: ICD-10-CM

## 2021-05-02 DIAGNOSIS — R51.9 CHRONIC NONINTRACTABLE HEADACHE, UNSPECIFIED HEADACHE TYPE: ICD-10-CM

## 2021-05-03 RX ORDER — BUTALBITAL, ACETAMINOPHEN, CAFFEINE AND CODEINE PHOSPHATE 50; 325; 40; 30 MG/1; MG/1; MG/1; MG/1
CAPSULE ORAL
Qty: 60 CAPSULE | Refills: 0 | Status: SHIPPED | OUTPATIENT
Start: 2021-05-03 | End: 2021-06-30 | Stop reason: SDUPTHER

## 2021-05-28 DIAGNOSIS — E11.9 TYPE 2 DIABETES MELLITUS WITHOUT COMPLICATION, WITHOUT LONG-TERM CURRENT USE OF INSULIN (HCC): ICD-10-CM

## 2021-05-28 RX ORDER — FLASH GLUCOSE SENSOR
KIT MISCELLANEOUS
Qty: 6 EACH | Refills: 1 | Status: SHIPPED | OUTPATIENT
Start: 2021-05-28 | End: 2021-06-28

## 2021-06-27 DIAGNOSIS — E11.9 TYPE 2 DIABETES MELLITUS WITHOUT COMPLICATION, WITHOUT LONG-TERM CURRENT USE OF INSULIN (HCC): ICD-10-CM

## 2021-06-28 RX ORDER — FLASH GLUCOSE SENSOR
KIT MISCELLANEOUS
Qty: 2 EACH | Refills: 5 | Status: SHIPPED | OUTPATIENT
Start: 2021-06-28 | End: 2021-10-04

## 2021-06-30 ENCOUNTER — TELEPHONE (OUTPATIENT)
Dept: PRIMARY CARE CLINIC | Age: 81
End: 2021-06-30

## 2021-06-30 ENCOUNTER — OFFICE VISIT (OUTPATIENT)
Dept: PRIMARY CARE CLINIC | Age: 81
End: 2021-06-30
Payer: MEDICARE

## 2021-06-30 VITALS
OXYGEN SATURATION: 94 % | SYSTOLIC BLOOD PRESSURE: 132 MMHG | DIASTOLIC BLOOD PRESSURE: 80 MMHG | WEIGHT: 245 LBS | HEART RATE: 65 BPM | HEIGHT: 67 IN | BODY MASS INDEX: 38.45 KG/M2

## 2021-06-30 DIAGNOSIS — Z23 NEED FOR VACCINATION: ICD-10-CM

## 2021-06-30 DIAGNOSIS — N40.1 BPH WITH OBSTRUCTION/LOWER URINARY TRACT SYMPTOMS: ICD-10-CM

## 2021-06-30 DIAGNOSIS — E11.9 TYPE 2 DIABETES MELLITUS WITHOUT COMPLICATION, WITHOUT LONG-TERM CURRENT USE OF INSULIN (HCC): Primary | ICD-10-CM

## 2021-06-30 DIAGNOSIS — Z91.81 AT HIGH RISK FOR FALLS: ICD-10-CM

## 2021-06-30 DIAGNOSIS — N13.8 BPH WITH OBSTRUCTION/LOWER URINARY TRACT SYMPTOMS: ICD-10-CM

## 2021-06-30 DIAGNOSIS — G89.29 CHRONIC NONINTRACTABLE HEADACHE, UNSPECIFIED HEADACHE TYPE: ICD-10-CM

## 2021-06-30 DIAGNOSIS — R35.0 URINARY FREQUENCY: ICD-10-CM

## 2021-06-30 DIAGNOSIS — I10 BENIGN ESSENTIAL HYPERTENSION: ICD-10-CM

## 2021-06-30 DIAGNOSIS — R51.9 CHRONIC NONINTRACTABLE HEADACHE, UNSPECIFIED HEADACHE TYPE: ICD-10-CM

## 2021-06-30 DIAGNOSIS — H61.22 IMPACTED CERUMEN OF LEFT EAR: ICD-10-CM

## 2021-06-30 DIAGNOSIS — I25.10 CORONARY ARTERY DISEASE INVOLVING NATIVE HEART WITHOUT ANGINA PECTORIS, UNSPECIFIED VESSEL OR LESION TYPE: ICD-10-CM

## 2021-06-30 DIAGNOSIS — E03.9 HYPOTHYROIDISM, UNSPECIFIED TYPE: ICD-10-CM

## 2021-06-30 LAB
BILIRUBIN, POC: NORMAL
BLOOD URINE, POC: NORMAL
CLARITY, POC: NORMAL
COLOR, POC: NORMAL
CREATININE URINE POCT: NORMAL
GLUCOSE URINE, POC: NORMAL
HBA1C MFR BLD: 6.5 %
KETONES, POC: NORMAL
LEUKOCYTE EST, POC: NORMAL
MICROALBUMIN/CREAT 24H UR: NORMAL MG/G{CREAT}
MICROALBUMIN/CREAT UR-RTO: NORMAL
NITRITE, POC: NORMAL
PH, POC: 7
PROTEIN, POC: NORMAL
SPECIFIC GRAVITY, POC: 1.01
UROBILINOGEN, POC: 0.2

## 2021-06-30 PROCEDURE — G8417 CALC BMI ABV UP PARAM F/U: HCPCS | Performed by: PHYSICIAN ASSISTANT

## 2021-06-30 PROCEDURE — 4040F PNEUMOC VAC/ADMIN/RCVD: CPT | Performed by: PHYSICIAN ASSISTANT

## 2021-06-30 PROCEDURE — 1123F ACP DISCUSS/DSCN MKR DOCD: CPT | Performed by: PHYSICIAN ASSISTANT

## 2021-06-30 PROCEDURE — 1036F TOBACCO NON-USER: CPT | Performed by: PHYSICIAN ASSISTANT

## 2021-06-30 PROCEDURE — G8427 DOCREV CUR MEDS BY ELIG CLIN: HCPCS | Performed by: PHYSICIAN ASSISTANT

## 2021-06-30 PROCEDURE — 82044 UR ALBUMIN SEMIQUANTITATIVE: CPT | Performed by: PHYSICIAN ASSISTANT

## 2021-06-30 PROCEDURE — 99215 OFFICE O/P EST HI 40 MIN: CPT | Performed by: PHYSICIAN ASSISTANT

## 2021-06-30 PROCEDURE — 81003 URINALYSIS AUTO W/O SCOPE: CPT | Performed by: PHYSICIAN ASSISTANT

## 2021-06-30 PROCEDURE — 83036 HEMOGLOBIN GLYCOSYLATED A1C: CPT | Performed by: PHYSICIAN ASSISTANT

## 2021-06-30 RX ORDER — LEVOTHYROXINE SODIUM 137 UG/1
TABLET ORAL
Qty: 90 TABLET | Refills: 3 | Status: SHIPPED | OUTPATIENT
Start: 2021-06-30 | End: 2022-06-08

## 2021-06-30 RX ORDER — SERTRALINE HYDROCHLORIDE 100 MG/1
TABLET, FILM COATED ORAL
Qty: 90 TABLET | Refills: 3 | Status: SHIPPED | OUTPATIENT
Start: 2021-06-30

## 2021-06-30 RX ORDER — BUDESONIDE 0.5 MG/2ML
INHALANT ORAL
COMMUNITY
Start: 2021-05-22 | End: 2021-06-30

## 2021-06-30 RX ORDER — BUDESONIDE 0.5 MG/2ML
1 INHALANT ORAL DAILY
Qty: 90 AMPULE | Refills: 1 | Status: SHIPPED | OUTPATIENT
Start: 2021-06-30 | End: 2021-08-31 | Stop reason: SDUPTHER

## 2021-06-30 RX ORDER — ALBUTEROL SULFATE 2.5 MG/3ML
SOLUTION RESPIRATORY (INHALATION)
COMMUNITY
Start: 2021-05-22

## 2021-06-30 RX ORDER — FLASH GLUCOSE SCANNING READER
EACH MISCELLANEOUS
Qty: 1 DEVICE | Refills: 0 | Status: SHIPPED | OUTPATIENT
Start: 2021-06-30 | End: 2022-07-21 | Stop reason: SDUPTHER

## 2021-06-30 RX ORDER — BUTALBITAL, ACETAMINOPHEN, CAFFEINE AND CODEINE PHOSPHATE 50; 325; 40; 30 MG/1; MG/1; MG/1; MG/1
CAPSULE ORAL
Qty: 180 CAPSULE | Refills: 0 | Status: SHIPPED | OUTPATIENT
Start: 2021-06-30 | End: 2021-09-30

## 2021-06-30 RX ORDER — TAMSULOSIN HYDROCHLORIDE 0.4 MG/1
CAPSULE ORAL
Qty: 90 CAPSULE | Refills: 3 | Status: SHIPPED | OUTPATIENT
Start: 2021-06-30 | End: 2022-06-01

## 2021-06-30 ASSESSMENT — ENCOUNTER SYMPTOMS
CHEST TIGHTNESS: 0
EYE PAIN: 0
SHORTNESS OF BREATH: 0
NAUSEA: 0
SORE THROAT: 0
COUGH: 0
EYE DISCHARGE: 0
VOMITING: 0

## 2021-06-30 ASSESSMENT — PATIENT HEALTH QUESTIONNAIRE - PHQ9
SUM OF ALL RESPONSES TO PHQ QUESTIONS 1-9: 0
1. LITTLE INTEREST OR PLEASURE IN DOING THINGS: 0
SUM OF ALL RESPONSES TO PHQ9 QUESTIONS 1 & 2: 0
SUM OF ALL RESPONSES TO PHQ QUESTIONS 1-9: 0
2. FEELING DOWN, DEPRESSED OR HOPELESS: 0
SUM OF ALL RESPONSES TO PHQ QUESTIONS 1-9: 0

## 2021-06-30 NOTE — TELEPHONE ENCOUNTER
He is no longer on Breo per daughter. He takes Pulmicort nebs once daily and albuterol nebs as needed.

## 2021-06-30 NOTE — PROGRESS NOTES
[address] [facility] PRIMARY CARE  [address] [address]  [address] [address]  Dept: 900 [facility] Bhavin Dickerson is a [de-identified] y.o. male who presents today for his medical conditions/complaints as noted below. Chief Complaint   Patient presents with    Diabetes     patient has freestyle [place], unsure how to use. Patient does not check sugars. HPI:   Have not seen this patient for 2 years. Diabetes  He presents for his follow-up diabetic visit. He has type 2 diabetes mellitus. Hypoglycemia symptoms include headaches. Pertinent negatives for hypoglycemia include no dizziness. Associated symptoms include fatigue. Pertinent negatives for diabetes include no chest pain, no polydipsia, no polyphagia and no weakness. Current diabetic treatment includes diet. He is compliant with treatment some of the time. His weight is increasing rapidly. He rarely participates in exercise. Home blood sugar record trend: not checking. An ACE inhibitor/angiotensin II receptor blocker is being taken. He does not see a podiatrist.Eye exam is not current. Headaches: controlled, not have many. Feels tired otherwise feels good. CAD: Cardiology last seen---Dr. Manoj Cardozo 1 1/2 years ago    HTN: controlled. Gaining weight and no exercise.      Did fall early in year but no further issues    LDL Cholesterol (mg/dL)   Date Value   12/16/2020 120   02/01/2016 160 (H)   01/08/2013 129 (H)     LDL Calculated (mg/dL)   Date Value   11/28/2017 122   11/05/2016 193 (A)       (goal LDL is <100)   AST (U/L)   Date Value   12/16/2020 19     ALT (U/L)   Date Value   12/16/2020 17     BUN (mg/dL)   Date Value   06/28/2019 14     BP Readings from Last 3 Encounters:   06/30/21 132/80   09/11/19 134/84   08/20/19 124/82          (goal 120/80)    Past Medical History:   Diagnosis Date    Depression     Hyperlipidemia     Hypertension     Hypothyroidism       Past Surgical History:   Procedure Laterality Date    CORONARY ANGIOPLASTY WITH STENT PLACEMENT  2013    EYE SURGERY      as a child       Family History   Problem Relation Age of Onset    Cancer Mother         breast    Diabetes Brother     Heart Disease Brother     High Blood Pressure Brother     Diabetes Maternal Uncle     Heart Disease Maternal Uncle     Cancer Other         throat       Social History     Tobacco Use    Smoking status: Never Smoker    Smokeless tobacco: Never Used   Substance Use Topics    Alcohol use: No      Current Outpatient Medications   Medication Sig Dispense Refill    metFORMIN (GLUCOPHAGE) 500 MG tablet Take 1 tablet by mouth daily (with breakfast) 180 tablet 1    butalbital-acetaminophen-caffeine-codeine (FIORICET WITH CODEINE) -06-30 MG per capsule TAKE 1 CAPSULE BY MOUTH EVERY EIGHT HOURS AS NEEDED FOR HEADACHE 180 capsule 0    levothyroxine (SYNTHROID) 137 MCG tablet TAKE ONE TABLET BY MOUTH ONE TIME DAILY 90 tablet 3    tamsulosin (FLOMAX) 0.4 MG capsule TAKE 1 CAPSULE EVERY EVENING 90 capsule 3    sertraline (ZOLOFT) 100 MG tablet TAKE ONE TABLET BY MOUTH ONE TIME DAILY 90 tablet 3    Fluticasone furoate-vilanterol (BREO ELLIPTA) 200-25 MCG/INH AEPB inhaler 2 puffs qd 3 each 3    budesonide (PULMICORT) 0.5 MG/2ML nebulizer suspension Take 2 mLs by nebulization daily 90 ampule 1    Continuous Blood Gluc  (FREESTNeurologix MARSHAL 14 DAY READER) SAVANNAH Testing BID. 1 Device 0    Continuous Blood Gluc Sensor (FREESTYLE MARSHAL 14 DAY SENSOR) MISC APPLY ONE SENSOR TO THE UPPER ARM ONCE EVERY 14 DAYS 2 each 5    carvedilol (COREG) 6.25 MG tablet Take 1 tablet by mouth 2 times daily   3    Omega-3 Fatty Acids (FISH OIL OMEGA-3 PO) Take by mouth      clopidogrel (PLAVIX) 75 MG tablet Take 1 tablet by mouth daily 90 tablet 1    losartan (COZAAR) 100 MG tablet Take 1 tablet by mouth 2 times daily 180 tablet 1    Multiple Vitamins-Minerals (ONE DAILY MENS) TABS Take 1 tablet by mouth daily      albuterol (PROVENTIL) (2.5 MG/3ML) 0.083% nebulizer solution INHALE THE CONTENTS OF 1 VIAL EVERY 8 HOURS AS NEEDED      Alcohol Swabs PADS For any brand insurance will cover. TEST  each 11    Blood Glucose Monitoring Suppl MISC For any brand insurance will cover. GLUCOMETER. TEST BID. 1 each 0    blood glucose test strips (ASCENSIA AUTODISC VI;ONE TOUCH ULTRA TEST VI) strip For any brand insurance will cover. TEST  strip 11     No current facility-administered medications for this visit. Allergies   Allergen Reactions    Amiodarone Other (See Comments)     hallucinations       Health Maintenance   Topic Date Due    DTaP/Tdap/Td vaccine (1 - Tdap) Never done    Annual Wellness Visit (AWV)  Never done    TSH testing  06/28/2020    Potassium monitoring  06/28/2020    Creatinine monitoring  06/28/2020    Shingles Vaccine (3 of 3) 08/25/2021    Flu vaccine (Season Ended) 09/01/2021    Pneumococcal 65+ years Vaccine  Completed    COVID-19 Vaccine  Completed    Hepatitis A vaccine  Aged Out    Hib vaccine  Aged Out    Meningococcal (ACWY) vaccine  Aged Out       Subjective:      Review of Systems   Constitutional: Positive for fatigue. Negative for activity change, appetite change and fever. HENT: Negative for congestion and sore throat. Eyes: Negative for pain, discharge and visual disturbance. Respiratory: Negative for cough, chest tightness and shortness of breath. Cardiovascular: Positive for leg swelling. Negative for chest pain and palpitations. Gastrointestinal: Negative for nausea and vomiting. Endocrine: Negative for polydipsia and polyphagia. Genitourinary: Positive for frequency. Neurological: Positive for headaches. Negative for dizziness, weakness and numbness. Psychiatric/Behavioral: Negative for sleep disturbance. Objective:     /80   Pulse 65   Ht 5' 7\" (1.702 m)   Wt 245 lb (111.1 kg)   SpO2 94%   BMI 38.37 kg/m²   Physical Exam  Vitals reviewed.    Neck: Thyroid: No thyromegaly. Cardiovascular:      Rate and Rhythm: Normal rate and regular rhythm. Heart sounds: Normal heart sounds. Pulmonary:      Effort: Pulmonary effort is normal.      Breath sounds: Normal breath sounds. Neurological:      Mental Status: He is alert and oriented to person, place, and time. Assessment:       Diagnosis Orders   1. Type 2 diabetes mellitus without complication, without long-term current use of insulin (HCC)  POCT glycosylated hemoglobin (Hb A1C)    POCT microalbumin    Comprehensive Metabolic Panel, Fasting    [facility]    Continuous Blood Gluc  (Sky StorageYLE TREY 14 DAY READER) SAVANNAH   2. BPH with obstruction/lower urinary tract symptoms  tamsulosin (FLOMAX) 0.4 MG capsule   3. Benign essential hypertension  Comprehensive Metabolic Panel, Fasting   4. Coronary artery disease involving native heart without angina pectoris, unspecified vessel or lesion type  Lipid Panel   5. Hypothyroidism, unspecified type  TSH without Reflex    T4, Free   6. Impacted cerumen of left ear  Ear wax removal   7. Need for vaccination  zoster recombinant adjuvanted vaccine [facility]) injection 50 mcg   8. Urinary frequency  POCT Urinalysis No Micro (Auto)   9. At high risk for falls     10. Chronic nonintractable headache, unspecified headache type  butalbital-acetaminophen-caffeine-codeine (FIORICET WITH CODEINE) -14-30 MG per capsule        Plan:    Have eye exam  Reviewed how to use the Free style Trey and test 2-3 times a day  Sock roller script given for support hose  Sent all the maintenance meds as 90 day for [facility] except Dr. Enrique Paredes for reader of Free Style Trey. Did not have one so sent script in  Get Dr. Korin Godoy note: Daughter days pulmicort is once daily and albuterol is bid. BW ordered  Tolerated the Ear wash.    Shingrix today  Neither he nor daughter feel a walking aid is needed,    Return in about 3 months (around 2021) for Diabetes and 2nd Shingrix.     Orders Placed This Encounter   Procedures    Lipid Panel     Standing Status:   Future     Standing Expiration Date:   2022     Order Specific Question:   Is Patient Fasting?/# of Hours     Answer:   10-12 hours    Comprehensive Metabolic Panel, Fasting     Standing Status:   Future     Standing Expiration Date:   2022    TSH without Reflex     Standing Status:   Future     Standing Expiration Date:   2022    T4, Free     Standing Status:   Future     Standing Expiration Date:   2022   [facility]- [facility]     Referral Priority:   Routine     Referral Type:   Eval and Treat     Referral Reason:   Specialty Services Required     Requested Specialty:   Nutrition     Number of Visits Requested:   1    Ear wax removal    POCT glycosylated hemoglobin (Hb A1C)    POCT microalbumin    POCT Urinalysis No Micro (Auto)     Orders Placed This Encounter   Medications    metFORMIN (GLUCOPHAGE) 500 MG tablet     Sig: Take 1 tablet by mouth daily (with breakfast)     Dispense:  180 tablet     Refill:  1    butalbital-acetaminophen-caffeine-codeine (FIORICET WITH CODEINE) -20-30 MG per capsule     Sig: TAKE 1 CAPSULE BY MOUTH EVERY EIGHT HOURS AS NEEDED FOR HEADACHE     Dispense:  180 capsule     Refill:  0     Reduce doses taken as pain becomes manageable    levothyroxine (SYNTHROID) 137 MCG tablet     Sig: TAKE ONE TABLET BY MOUTH ONE TIME DAILY     Dispense:  90 tablet     Refill:  3    tamsulosin (FLOMAX) 0.4 MG capsule     Sig: TAKE 1 CAPSULE EVERY EVENING     Dispense:  90 capsule     Refill:  3    sertraline (ZOLOFT) 100 MG tablet     Sig: TAKE ONE TABLET BY MOUTH ONE TIME DAILY     Dispense:  90 tablet     Refill:  3    Fluticasone furoate-vilanterol (BREO ELLIPTA) 200-25 MCG/INH AEPB inhaler     Si puffs qd     Dispense:  3 each     Refill:  3     Given by [facility] place of [facility] budesonide (PULMICORT) 0.5 MG/2ML nebulizer suspension     Sig: Take 2 mLs by nebulization daily     Dispense:  90 ampule     Refill:  1    Continuous Blood Gluc  (FREESTYLE MARSHAL 14 DAY READER) SAVANNAH     Sig: Testing BID. Dispense:  1 Device     Refill:  0    zoster recombinant adjuvanted vaccine [facility]) injection 50 mcg       Patient given educational materials - see patient instructions. Discussed use, benefit, and side effects of prescribed medications. All patient questions answered. Pt voiced understanding. Reviewed health maintenance. Instructed to continue current medications, diet and exercise. Patient agreed with treatment plan. Follow up as directed. Electronically signed by Collins Green PA-C on 6/30/2021 at 8:39 PM    On the basis of positive falls risk screening, assessment and plan is as follows: home safety tips provided.

## 2021-06-30 NOTE — TELEPHONE ENCOUNTER
Pharmacy is calling to confirm the directions of use for the patients Breo inhaler, they states its to be used once daily but the RX is wrote as 2 puffs 2x daily.       [facility] pharmacy- [phone]  Please advise

## 2021-07-09 LAB
ALBUMIN SERPL-MCNC: NORMAL G/DL
ALP BLD-CCNC: NORMAL U/L
ALT SERPL-CCNC: NORMAL U/L
AST SERPL-CCNC: NORMAL U/L
BILIRUB SERPL-MCNC: NORMAL MG/DL
BUN BLDV-MCNC: NORMAL MG/DL
CALCIUM SERPL-MCNC: NORMAL MG/DL
CHLORIDE BLD-SCNC: NORMAL MMOL/L
CHOLESTEROL, TOTAL: 211 MG/DL
CHOLESTEROL/HDL RATIO: 7
CO2: NORMAL
CREAT SERPL-MCNC: NORMAL MG/DL
GLUCOSE FASTING: 123 MG/DL
HDLC SERPL-MCNC: 30 MG/DL (ref 35–70)
LDL CHOLESTEROL CALCULATED: 122 MG/DL (ref 0–160)
NONHDLC SERPL-MCNC: ABNORMAL MG/DL
POTASSIUM SERPL-SCNC: NORMAL MMOL/L
SODIUM BLD-SCNC: NORMAL MMOL/L
T4 FREE: NORMAL
TOTAL PROTEIN: NORMAL
TRIGL SERPL-MCNC: 297 MG/DL
TSH SERPL DL<=0.05 MIU/L-ACNC: NORMAL M[IU]/L
VLDLC SERPL CALC-MCNC: 59 MG/DL

## 2021-07-12 DIAGNOSIS — I10 BENIGN ESSENTIAL HYPERTENSION: ICD-10-CM

## 2021-07-12 DIAGNOSIS — I25.10 CORONARY ARTERY DISEASE INVOLVING NATIVE HEART WITHOUT ANGINA PECTORIS, UNSPECIFIED VESSEL OR LESION TYPE: ICD-10-CM

## 2021-07-12 DIAGNOSIS — E03.9 HYPOTHYROIDISM, UNSPECIFIED TYPE: ICD-10-CM

## 2021-07-12 DIAGNOSIS — E11.9 TYPE 2 DIABETES MELLITUS WITHOUT COMPLICATION, WITHOUT LONG-TERM CURRENT USE OF INSULIN (HCC): ICD-10-CM

## 2021-07-15 ENCOUNTER — TELEPHONE (OUTPATIENT)
Dept: PRIMARY CARE CLINIC | Age: 81
End: 2021-07-15

## 2021-07-15 DIAGNOSIS — E03.9 HYPOTHYROIDISM, UNSPECIFIED TYPE: Primary | ICD-10-CM

## 2021-07-15 DIAGNOSIS — J45.30 MILD PERSISTENT ASTHMA WITHOUT COMPLICATION: Primary | ICD-10-CM

## 2021-07-22 ENCOUNTER — TELEPHONE (OUTPATIENT)
Dept: PRIMARY CARE CLINIC | Age: 81
End: 2021-07-22

## 2021-07-22 DIAGNOSIS — J45.30 MILD PERSISTENT ASTHMA WITHOUT COMPLICATION: Primary | ICD-10-CM

## 2021-07-22 DIAGNOSIS — R91.8 INDETERMINATE PULMONARY NODULES: ICD-10-CM

## 2021-07-22 NOTE — TELEPHONE ENCOUNTER
Pt's daughter [facility] called stating a referral to Dr. Danial Lennox was sent in for pt but their office states pt needs to have a lung XR done before he can even schedule an appointment. She states she would like to get him in as soon as possible, she is asking if you could order the xr and she will take him to have done Monday. She is going to go to a [facility] facility.        Please advise           Please call daughter at [phone], ok to leave message if no answer

## 2021-08-19 ENCOUNTER — HOSPITAL ENCOUNTER (OUTPATIENT)
Age: 81
Setting detail: SPECIMEN
Discharge: HOME | End: 2021-08-19
Payer: MEDICARE

## 2021-08-19 ENCOUNTER — OFFICE VISIT (OUTPATIENT)
Dept: PRIMARY CARE CLINIC | Age: 81
End: 2021-08-19
Payer: MEDICARE

## 2021-08-19 VITALS
DIASTOLIC BLOOD PRESSURE: 76 MMHG | OXYGEN SATURATION: 92 % | WEIGHT: 243 LBS | BODY MASS INDEX: 38.14 KG/M2 | HEART RATE: 63 BPM | TEMPERATURE: 98.3 F | SYSTOLIC BLOOD PRESSURE: 128 MMHG | HEIGHT: 67 IN

## 2021-08-19 DIAGNOSIS — R06.02 SOB (SHORTNESS OF BREATH): Primary | ICD-10-CM

## 2021-08-19 DIAGNOSIS — R06.02 SOB (SHORTNESS OF BREATH): ICD-10-CM

## 2021-08-19 DIAGNOSIS — E66.01 SEVERE OBESITY (BMI 35.0-35.9 WITH COMORBIDITY) (HCC): ICD-10-CM

## 2021-08-19 DIAGNOSIS — G47.9 SLEEP DISTURBANCE: ICD-10-CM

## 2021-08-19 DIAGNOSIS — R41.0 CONFUSION: ICD-10-CM

## 2021-08-19 DIAGNOSIS — L60.0 INGROWN TOENAIL: ICD-10-CM

## 2021-08-19 DIAGNOSIS — R53.83 LETHARGIC: ICD-10-CM

## 2021-08-19 DIAGNOSIS — E11.9 TYPE 2 DIABETES MELLITUS WITHOUT COMPLICATION, WITHOUT LONG-TERM CURRENT USE OF INSULIN (HCC): ICD-10-CM

## 2021-08-19 LAB
ABSOLUTE EOS #: 0.56 K/UL (ref 0–0.44)
ABSOLUTE IMMATURE GRANULOCYTE: 0.05 K/UL (ref 0–0.3)
ABSOLUTE LYMPH #: 2.48 K/UL (ref 1.1–3.7)
ABSOLUTE MONO #: 0.92 K/UL (ref 0.1–1.2)
ANION GAP SERPL CALCULATED.3IONS-SCNC: 12 MMOL/L (ref 9–17)
BASOPHILS # BLD: 1 % (ref 0–2)
BASOPHILS ABSOLUTE: 0.1 K/UL (ref 0–0.2)
BILIRUBIN, POC: NORMAL
BLOOD URINE, POC: NORMAL
BNP INTERPRETATION: NORMAL
BUN BLDV-MCNC: 15 MG/DL (ref 8–23)
BUN/CREAT BLD: ABNORMAL (ref 9–20)
CALCIUM SERPL-MCNC: 9.5 MG/DL (ref 8.6–10.4)
CHLORIDE BLD-SCNC: 102 MMOL/L (ref 98–107)
CLARITY, POC: NORMAL
CO2: 26 MMOL/L (ref 20–31)
COLOR, POC: NORMAL
CREAT SERPL-MCNC: 1.21 MG/DL (ref 0.7–1.2)
DIFFERENTIAL TYPE: ABNORMAL
EOSINOPHILS RELATIVE PERCENT: 7 % (ref 1–4)
GFR AFRICAN AMERICAN: >60 ML/MIN
GFR NON-AFRICAN AMERICAN: 58 ML/MIN
GFR SERPL CREATININE-BSD FRML MDRD: ABNORMAL ML/MIN/{1.73_M2}
GFR SERPL CREATININE-BSD FRML MDRD: ABNORMAL ML/MIN/{1.73_M2}
GLUCOSE BLD-MCNC: 96 MG/DL (ref 70–99)
GLUCOSE URINE, POC: NORMAL
HCT VFR BLD CALC: 40.9 % (ref 40.7–50.3)
HEMOGLOBIN: 12.8 G/DL (ref 13–17)
IMMATURE GRANULOCYTES: 1 %
KETONES, POC: NORMAL
LEUKOCYTE EST, POC: NORMAL
LYMPHOCYTES # BLD: 29 % (ref 24–43)
MCH RBC QN AUTO: 28.6 PG (ref 25.2–33.5)
MCHC RBC AUTO-ENTMCNC: 31.3 G/DL (ref 28.4–34.8)
MCV RBC AUTO: 91.3 FL (ref 82.6–102.9)
MONOCYTES # BLD: 11 % (ref 3–12)
NITRITE, POC: NORMAL
NRBC AUTOMATED: 0 PER 100 WBC
PDW BLD-RTO: 14.1 % (ref 11.8–14.4)
PH, POC: 6
PLATELET # BLD: 198 K/UL (ref 138–453)
PLATELET ESTIMATE: ABNORMAL
PMV BLD AUTO: 12.4 FL (ref 8.1–13.5)
POTASSIUM SERPL-SCNC: 4.4 MMOL/L (ref 3.7–5.3)
PRO-BNP: 52 PG/ML
PROTEIN, POC: NORMAL
RBC # BLD: 4.48 M/UL (ref 4.21–5.77)
RBC # BLD: ABNORMAL 10*6/UL
SEG NEUTROPHILS: 51 % (ref 36–65)
SEGMENTED NEUTROPHILS ABSOLUTE COUNT: 4.5 K/UL (ref 1.5–8.1)
SODIUM BLD-SCNC: 140 MMOL/L (ref 135–144)
SPECIFIC GRAVITY, POC: 1.01
UROBILINOGEN, POC: 0.2
WBC # BLD: 8.6 K/UL (ref 3.5–11.3)
WBC # BLD: ABNORMAL 10*3/UL

## 2021-08-19 PROCEDURE — 99214 OFFICE O/P EST MOD 30 MIN: CPT | Performed by: PHYSICIAN ASSISTANT

## 2021-08-19 PROCEDURE — 81003 URINALYSIS AUTO W/O SCOPE: CPT | Performed by: PHYSICIAN ASSISTANT

## 2021-08-19 PROCEDURE — G8417 CALC BMI ABV UP PARAM F/U: HCPCS | Performed by: PHYSICIAN ASSISTANT

## 2021-08-19 PROCEDURE — 1036F TOBACCO NON-USER: CPT | Performed by: PHYSICIAN ASSISTANT

## 2021-08-19 PROCEDURE — G8427 DOCREV CUR MEDS BY ELIG CLIN: HCPCS | Performed by: PHYSICIAN ASSISTANT

## 2021-08-19 PROCEDURE — 4040F PNEUMOC VAC/ADMIN/RCVD: CPT | Performed by: PHYSICIAN ASSISTANT

## 2021-08-19 PROCEDURE — 1123F ACP DISCUSS/DSCN MKR DOCD: CPT | Performed by: PHYSICIAN ASSISTANT

## 2021-08-19 RX ORDER — TRAZODONE HYDROCHLORIDE 50 MG/1
50 TABLET ORAL NIGHTLY
Qty: 30 TABLET | Refills: 2 | Status: SHIPPED | OUTPATIENT
Start: 2021-08-19 | End: 2021-09-23 | Stop reason: SINTOL

## 2021-08-19 ASSESSMENT — ENCOUNTER SYMPTOMS: SHORTNESS OF BREATH: 1

## 2021-08-19 NOTE — PROGRESS NOTES
[facility] Primary Care  [address]  Phone: [phone]  Fax: [phone]    Julieta Enriquez is a [de-identified] y.o. male who presents today for his medical conditions/complaintsas noted below. Chief Complaint   Patient presents with    Altered Mental Status     started 2 weeks ago.  Shortness of Breath     Pts daughter said he has always been SOB, and does 2 breathing tx a day. Pt is scheduled to see Kyaw Morales on 8/31/21. Pt seen Cardio last week and he said he heard fluid in his lower lungs    Fussy    Fatigue     started 2 weeks ago.  Wound Infection     possible infected toe. HPI:     HPI  Starting becoming agitated and confused 2 weeks ago. Patient says he is fine, but daughter is concerned. Daughter stopped Metformin but has not checked any sugars when he is feeling \"off\"      CXR on 7/29/21 was normal.  No fever, no N/V, no increased urination, no chills or sweats. Toe: nail cut too short and is thicker on great toe    Dr. Phillip Fry thought he heard fluid in lower lobes 2 weeks ago. No diuretic started. Not sleeping well and getting his HAs again.       Current Outpatient Medications   Medication Sig Dispense Refill    traZODone (DESYREL) 50 MG tablet Take 1 tablet by mouth nightly 30 tablet 2    albuterol (PROVENTIL) (2.5 MG/3ML) 0.083% nebulizer solution INHALE THE CONTENTS OF 1 VIAL EVERY 8 HOURS AS NEEDED      metFORMIN (GLUCOPHAGE) 500 MG tablet Take 1 tablet by mouth daily (with breakfast) 180 tablet 1    butalbital-acetaminophen-caffeine-codeine (FIORICET WITH CODEINE) -34-30 MG per capsule TAKE 1 CAPSULE BY MOUTH EVERY EIGHT HOURS AS NEEDED FOR HEADACHE 180 capsule 0    levothyroxine (SYNTHROID) 137 MCG tablet TAKE ONE TABLET BY MOUTH ONE TIME DAILY 90 tablet 3    tamsulosin (FLOMAX) 0.4 MG capsule TAKE 1 CAPSULE EVERY EVENING 90 capsule 3    sertraline (ZOLOFT) 100 MG tablet TAKE ONE TABLET BY MOUTH ONE TIME DAILY 90 tablet 3    Fluticasone furoate-vilanterol (BREO ELLIPTA) 200-25 MCG/INH AEPB inhaler 2 puffs qd 3 each 3    budesonide (PULMICORT) 0.5 MG/2ML nebulizer suspension Take 2 mLs by nebulization daily 90 ampule 1    Continuous Blood Gluc  (FREESTYLE MARSHAL 14 DAY READER) SAVANNAH Testing BID. 1 Device 0    Continuous Blood Gluc Sensor (FREESTYLE MARSHAL 14 DAY SENSOR) [facility] APPLY ONE SENSOR TO THE UPPER ARM ONCE EVERY 14 DAYS 2 each 5    Alcohol Swabs PADS For any brand insurance will cover. TEST  each 11    Blood Glucose Monitoring Suppl MISC For any brand insurance will cover. GLUCOMETER. TEST BID. 1 each 0    blood glucose test strips (ASCENSIA AUTODISC VI;ONE TOUCH ULTRA TEST VI) strip For any brand insurance will cover. TEST  strip 11    carvedilol (COREG) 6.25 MG tablet Take 1 tablet by mouth 2 times daily   3    Omega-3 Fatty Acids (FISH OIL OMEGA-3 PO) Take by mouth      clopidogrel (PLAVIX) 75 MG tablet Take 1 tablet by mouth daily 90 tablet 1    losartan (COZAAR) 100 MG tablet Take 1 tablet by mouth 2 times daily 180 tablet 1    Multiple Vitamins-Minerals (ONE DAILY MENS) TABS Take 1 tablet by mouth daily       No current facility-administered medications for this visit. Allergies   Allergen Reactions    Amiodarone Other (See Comments)     hallucinations       Subjective:      Review of Systems   Constitutional: Positive for fatigue. Negative for chills, diaphoresis, fever and unexpected weight change. HENT: Negative. Respiratory: Positive for shortness of breath. Negative for cough, chest tightness and wheezing. Cardiovascular: Positive for leg swelling (minor). Negative for chest pain and palpitations. Gastrointestinal: Negative for abdominal pain, diarrhea, nausea and vomiting. Endocrine: Negative. Genitourinary: Negative. Skin: Positive for wound. Neurological: Positive for headaches. Negative for dizziness, weakness and light-headedness. Psychiatric/Behavioral: Positive for agitation, confusion and sleep disturbance. Objective:     /76   Pulse 63   Temp 98.3 °F (36.8 °C)   Ht 5' 7\" (1.702 m)   Wt 243 lb (110.2 kg)   SpO2 92%   BMI 38.06 kg/m²   Physical Exam  Nursing note reviewed. Constitutional:       General: He is not in acute distress. Appearance: Normal appearance. He is obese. He is not ill-appearing, toxic-appearing or diaphoretic. HENT:      Right Ear: Tympanic membrane, ear canal and external ear normal.      Left Ear: Tympanic membrane, ear canal and external ear normal.      Nose: Nose normal.      Mouth/Throat:      Mouth: Mucous membranes are moist.   Eyes:      General:         Right eye: No discharge. Left eye: No discharge. Conjunctiva/sclera: Conjunctivae normal.      Pupils: Pupils are equal, round, and reactive to light. Cardiovascular:      Rate and Rhythm: Normal rate and regular rhythm. Heart sounds: Normal heart sounds. Pulmonary:      Effort: Pulmonary effort is normal.      Breath sounds: Normal breath sounds. Abdominal:      General: Abdomen is flat. Bowel sounds are normal. There is no distension. Tenderness: There is no abdominal tenderness. There is no guarding or rebound. Musculoskeletal:      Right lower leg: Edema (trace) present. Left lower leg: Edema present. Skin:     Findings: No rash. Neurological:      Mental Status: He is alert and oriented to person, place, and time. Psychiatric:         Mood and Affect: Mood normal.         Assessment:       Diagnosis Orders   1. SOB (shortness of breath)  Brain Natriuretic Peptide    CBC Auto Differential    Basic Metabolic Panel    XR CHEST (2 VW)   2. Confusion  POCT Urinalysis No Micro (Auto)    Brain Natriuretic Peptide    CBC Auto Differential    Basic Metabolic Panel    XR CHEST (2 VW)   3. Lethargic  XR CHEST (2 VW)   4.  Type 2 diabetes mellitus without complication, without long-term current use of insulin [facility])  [place], [place], [place], [place]   5. [address] [facility] [address] [place], [place], [place]   6. Severe obesity (BMI 35.0-35.9 with comorbidity) ([address]     7. Sleep disturbance          Plan:    XR and BW  REviewed 7/29/21 CXR --normal   Urine was normal.   No infection in toe---daughter asks for podiatry referral.    Did discuss stopping the oatmeal pies at night--might be getting elevated sugars and certainly not helping the weight gain. Start Trazodone for sleep  Return in about 6 weeks (around 9/30/2021) for recheck--1/2 hour.     Orders Placed This Encounter   Procedures    XR CHEST (2 VW)     Standing Status:   Future     Standing Expiration Date:   8/19/2022     Order Specific Question:   Reason for exam:     Answer:   see above    Brain Natriuretic Peptide     Standing Status:   Future     Number of Occurrences:   1     Standing Expiration Date:   8/19/2022    CBC Auto Differential     Standing Status:   Future     Number of Occurrences:   1     Standing Expiration Date:   8/19/2022    Basic Metabolic Panel     Standing Status:   Future     Number of Occurrences:   1     Standing Expiration Date:   8/19/2022   [facility], JOSE [facility], Podiatry, [place]     Referral Priority:   Routine     Referral Type:   Eval and Treat     Referral Reason:   Specialty Services Required     Referred to Provider:   Mark Mariano DPM     Requested Specialty:   Podiatry     Number of Visits Requested:   1    POCT Urinalysis No Micro (Auto)     Orders Placed This Encounter   Medications    traZODone (DESYREL) 50 MG tablet     Sig: Take 1 tablet by mouth nightly     Dispense:  30 tablet     Refill:  2           Electronically signed by Nancy Delgado 8/20/2021 at 8:43 AM

## 2021-08-20 ENCOUNTER — TELEPHONE (OUTPATIENT)
Dept: PRIMARY CARE CLINIC | Age: 81
End: 2021-08-20

## 2021-08-20 DIAGNOSIS — M25.50 ARTHRALGIA, UNSPECIFIED JOINT: Primary | ICD-10-CM

## 2021-08-20 RX ORDER — TRAMADOL HYDROCHLORIDE 50 MG/1
50 TABLET ORAL EVERY 6 HOURS PRN
Qty: 45 TABLET | Refills: 0 | Status: SHIPPED | OUTPATIENT
Start: 2021-08-20 | End: 2021-09-03

## 2021-08-20 ASSESSMENT — ENCOUNTER SYMPTOMS
CHEST TIGHTNESS: 0
ABDOMINAL PAIN: 0
DIARRHEA: 0
COUGH: 0
NAUSEA: 0
WHEEZING: 0
VOMITING: 0

## 2021-08-20 NOTE — TELEPHONE ENCOUNTER
Pt notified with message and instructions below. Would like the Tramadol sent to [facility] on [address]. If, he can take it at night, that would be great.

## 2021-08-20 NOTE — TELEPHONE ENCOUNTER
Pt's daughter called states he took a trazodone last night and it did nothing for him, he was still up 4 times and noticed no difference. She is asking if he can take more than one or what you recommend?  Also, she is asking if you can send in a pain pill for arthritis since his labs came back normal?

## 2021-08-20 NOTE — TELEPHONE ENCOUNTER
Need to give Trazodone a bit longer to try before we consider increase in the dose. She wants to use the narcotic, Tramadol, for the pain then?   He can not use NSAIDS with his heart disease

## 2021-08-31 ENCOUNTER — OFFICE VISIT (OUTPATIENT)
Dept: PULMONOLOGY | Age: 81
End: 2021-08-31
Payer: MEDICARE

## 2021-08-31 VITALS
HEIGHT: 67 IN | OXYGEN SATURATION: 95 % | HEART RATE: 76 BPM | RESPIRATION RATE: 16 BRPM | TEMPERATURE: 97.2 F | BODY MASS INDEX: 38.14 KG/M2 | DIASTOLIC BLOOD PRESSURE: 90 MMHG | WEIGHT: 243 LBS | SYSTOLIC BLOOD PRESSURE: 124 MMHG

## 2021-08-31 DIAGNOSIS — J45.30 MILD PERSISTENT ASTHMA WITHOUT COMPLICATION: ICD-10-CM

## 2021-08-31 DIAGNOSIS — G47.33 OBSTRUCTIVE SLEEP APNEA SYNDROME: ICD-10-CM

## 2021-08-31 DIAGNOSIS — E66.01 SEVERE OBESITY (BMI 35.0-35.9 WITH COMORBIDITY) (HCC): ICD-10-CM

## 2021-08-31 DIAGNOSIS — E03.9 HYPOTHYROIDISM, UNSPECIFIED TYPE: ICD-10-CM

## 2021-08-31 DIAGNOSIS — I25.10 CORONARY ARTERY DISEASE INVOLVING NATIVE CORONARY ARTERY OF NATIVE HEART WITHOUT ANGINA PECTORIS: ICD-10-CM

## 2021-08-31 DIAGNOSIS — J44.9 CHRONIC OBSTRUCTIVE PULMONARY DISEASE, UNSPECIFIED COPD TYPE (HCC): Primary | ICD-10-CM

## 2021-08-31 PROCEDURE — G8427 DOCREV CUR MEDS BY ELIG CLIN: HCPCS | Performed by: INTERNAL MEDICINE

## 2021-08-31 PROCEDURE — 99204 OFFICE O/P NEW MOD 45 MIN: CPT | Performed by: INTERNAL MEDICINE

## 2021-08-31 PROCEDURE — 4040F PNEUMOC VAC/ADMIN/RCVD: CPT | Performed by: INTERNAL MEDICINE

## 2021-08-31 PROCEDURE — G8417 CALC BMI ABV UP PARAM F/U: HCPCS | Performed by: INTERNAL MEDICINE

## 2021-08-31 PROCEDURE — 1123F ACP DISCUSS/DSCN MKR DOCD: CPT | Performed by: INTERNAL MEDICINE

## 2021-08-31 PROCEDURE — 1036F TOBACCO NON-USER: CPT | Performed by: INTERNAL MEDICINE

## 2021-08-31 PROCEDURE — 3023F SPIROM DOC REV: CPT | Performed by: INTERNAL MEDICINE

## 2021-08-31 PROCEDURE — G8926 SPIRO NO PERF OR DOC: HCPCS | Performed by: INTERNAL MEDICINE

## 2021-08-31 RX ORDER — BUDESONIDE 0.5 MG/2ML
1 INHALANT ORAL 2 TIMES DAILY
Qty: 180 AMPULE | Refills: 1 | Status: SHIPPED | OUTPATIENT
Start: 2021-08-31 | End: 2022-01-18

## 2021-09-02 NOTE — PROGRESS NOTES
PULMONARY  CONSULTATION        REFERRED BY: Jennifer Orozco PA-C    REASON FOR CONSULTATION: Asthma, COPD, sleep apnea    HISTORY OF PRESENT ILLNESS:    Kenrick Long is a [de-identified]y.o. year old male here for evaluation of above problems  Patient had significant exposure to secondhand smoking growing up with parents were smokers  Has shortness of breath for which the patient uses Pulmicort once a day and then albuterol every day  He shortness of breath is worsened with humidity  No wheezing currently  Not much cough or sputum production  No hemoptysis  No orthopnea or PND  No chest pain or pressure  No palpitations  No increasing pedal edema  Patient has daytime sleepiness and fatigue and tiredness  Had been diagnosed with sleep apnea in the past      PAST MEDICAL HISTORY:       Diagnosis Date    Depression     Hyperlipidemia     Hypertension     Hypothyroidism        SURGICAL HISTORY:    Past Surgical History:   Procedure Laterality Date    CORONARY ANGIOPLASTY WITH STENT PLACEMENT  2013    EYE SURGERY      as a child       ALLERGIES:    Allergies   Allergen Reactions    Amiodarone Other (See Comments)     hallucinations       MEDICATIONS:   Current Outpatient Medications   Medication Sig Dispense Refill    budesonide (PULMICORT) 0.5 MG/2ML nebulizer suspension Take 2 mLs by nebulization 2 times daily 180 ampule 1    traMADol (ULTRAM) 50 MG tablet Take 1 tablet by mouth every 6 hours as needed for Pain for up to 14 days. Intended supply: 5 days.  Take lowest dose possible to manage pain 45 tablet 0    traZODone (DESYREL) 50 MG tablet Take 1 tablet by mouth nightly 30 tablet 2    albuterol (PROVENTIL) (2.5 MG/3ML) 0.083% nebulizer solution INHALE THE CONTENTS OF 1 VIAL EVERY 8 HOURS AS NEEDED      metFORMIN (GLUCOPHAGE) 500 MG tablet Take 1 tablet by mouth daily (with breakfast) 180 tablet 1    butalbital-acetaminophen-caffeine-codeine (FIORICET WITH CODEINE) -22-30 MG per capsule TAKE 1 CAPSULE BY MOUTH EVERY EIGHT HOURS AS NEEDED FOR HEADACHE 180 capsule 0    levothyroxine (SYNTHROID) 137 MCG tablet TAKE ONE TABLET BY MOUTH ONE TIME DAILY 90 tablet 3    tamsulosin (FLOMAX) 0.4 MG capsule TAKE 1 CAPSULE EVERY EVENING 90 capsule 3    sertraline (ZOLOFT) 100 MG tablet TAKE ONE TABLET BY MOUTH ONE TIME DAILY 90 tablet 3    Fluticasone furoate-vilanterol (BREO ELLIPTA) 200-25 MCG/INH AEPB inhaler 2 puffs qd 3 each 3    Continuous Blood Gluc  (FREESTYLE MARSHAL 14 DAY READER) SAVANNAH Testing BID. 1 Device 0    Continuous Blood Gluc Sensor (FREESTYLE MARSHAL 14 DAY SENSOR) [facility] [place] APPLY ONE SENSOR TO THE UPPER ARM ONCE EVERY 14 DAYS 2 each 5    Alcohol Swabs PADS For any brand insurance will cover. TEST  each 11    Blood Glucose Monitoring Suppl MISC For any brand insurance will cover. GLUCOMETER. TEST BID. 1 each 0    blood glucose test strips (ASCENSIA AUTODISC VI;ONE TOUCH ULTRA TEST VI) strip For any brand insurance will cover. TEST  strip 11    carvedilol (COREG) 6.25 MG tablet Take 1 tablet by mouth 2 times daily   3    Omega-3 Fatty Acids (FISH OIL OMEGA-3 PO) Take by mouth      clopidogrel (PLAVIX) 75 MG tablet Take 1 tablet by mouth daily 90 tablet 1    losartan (COZAAR) 100 MG tablet Take 1 tablet by mouth 2 times daily 180 tablet 1    Multiple Vitamins-Minerals (ONE DAILY MENS) TABS Take 1 tablet by mouth daily       No current facility-administered medications for this visit. FAMILY HISTORY: family history includes Cancer in his mother and another family member; Diabetes in his brother and maternal uncle; Heart Disease in his brother and maternal uncle; High Blood Pressure in his brother. SOCIAL AND OCCUPATIONAL HEALTH:  The patient is a non-smoker but had exposure to secondhand smoke growing up in a household with smokers. There  is not history of TB or TB exposure. There is not asbestos or silica dust exposure.   The patient reports does not have coal, foundry, [address] or [employer] exposure. Travel history reveals no significant history of risk factors for pulm disease. There is not  history of recreational or IV drug use. The patient does not have pets, dogs, cats turtles or exotic birds.         Review of Systems:  Review of Systems -   General ROS: Completed and except as mentioned above were negative   Psychological ROS:  Completed and except as mentioned above were negative  Ophthalmic ROS:  Completed and except as mentioned above were negative  ENT ROS:  Completed and except as mentioned above were negative  Allergy and Immunology ROS:  Completed and except as mentioned above were negative  Hematological and Lymphatic ROS:  Completed and except as mentioned above were negative  Endocrine ROS: Completed and except as mentioned above were negative  Breast ROS:  Completed and except as mentioned above were negative  Respiratory ROS:  Completed and except as mentioned above were negative  Cardiovascular ROS:  Completed and except as mentioned above were negative  Gastrointestinal ROS: Completed and except as mentioned above were negative  Genito-Urinary ROS:  Completed and except as mentioned above were negative  Musculoskeletal ROS:  Completed and except as mentioned above were negative  Neurological ROS:  Completed and except as mentioned above were negative  Dermatological ROS:  Completed and except as mentioned above were negative        PHYSICAL EXAMINATION:  Vitals:    08/31/21 1339   BP: (!) 124/90   Pulse: 76   Resp: 16   Temp: 97.2 °F (36.2 °C)   SpO2: 95%   Weight: 243 lb (110.2 kg)   Height: 5' 7\" (1.702 m)     PHYSICAL EXAMINATION:  Vitals:    08/31/21 1339   BP: (!) 124/90   Pulse: 76   Resp: 16   Temp: 97.2 °F (36.2 °C)   SpO2: 95%   Weight: 243 lb (110.2 kg)   Height: 5' 7\" (1.702 m)     Constitutional: This is a well developed, well nourished, 35-39.9 - Obesity Grade II [de-identified]y.o. year old male who is alert, oriented, cooperative and in no apparent distress. Head:normocephalic and atraumatic. EENT:  ELENI. No conjunctival injections. Septum was midline, mucosa was without erythema, exudates or cobblestoning. No thrush was noted. MallampatiIII (soft palate, base of uvula visible)  Neck: Supple without thyromegaly. No elevated JVP. Trachea was midline. Respiratory: Chest was symmetrical without dullness to percussion. Breath sounds bilaterally were clear to auscultation. There were no wheezes, rhonchi or rales. There is no intercostal retraction or use of accessory muscles. No egophony noted. Cardiovascular: Regular without murmur, clicks, gallops or rubs. Abdomen: Slightly rounded and soft without organomegaly. No rebound, rigidity or guarding was appreciated. Lymphatic: No lymphadenopathy. Musculoskeletal: Normal curvature of the spine. No gross muscle weakness. Extremities:  does not have lower extremity edema,  no ulcerations, tenderness, varicosities or erythema. Muscle size, tone and strength are normal.  No involuntary movements are noted. Skin:  Warm and dry. Good color, turgor and pigmentation. No lesions or scars. No cyanosis or clubbing  Neurological/Psychiatric: The patient's general behavior, level of consciousness, thought content and emotional status is normal.          DATA:     Pulmonary function tests: none and were ordered        CXR: REVIEWED: In July 2021 patient had a film at the [facility] clinic that was without any acute problems    CT scan of the chest in 2018 without any acute abnormalities      IMPRESSION:   1. Chronic obstructive pulmonary disease, unspecified COPD type ([facility] [address]    2. Obstructive sleep apnea syndrome    3. Mild persistent asthma without complication    4. Severe obesity (BMI 35.0-35.9 with comorbidity) ([address]    5. Coronary artery disease involving native coronary artery of native heart without angina pectoris    6.  Hypothyroidism, unspecified type                   PLAN:       Refills were provided -Pulse oximeter, budesonide 0.5 mg to be used twice a day  Patient was recommended to have prednisone and an antibiotic available for use during an exacerbation  Educated and clarified the medication use. Discussed use, benefit, and side effects of prescribed medications. Barriers to medication compliance addressed. Claudetta Rake received counseling on the following healthy behaviors: nutrition, exercise and medication adherence  Recommend flu vaccination in the fall annually. Recommendations given regarding pneumococcal vaccinations. Patient had the COVID-19 vaccination  Patient is uptodate with vaccinations from pulmonary perspective. Maintain an active lifestyle. Recommend pulmonary rehabilitation. Patient was explained importance of pulmonary rehabilitation with regards to decreasing the hospitalization risk and also help with his dyspnea  Patient was educated on how to use the respiratory medications. All the questions that the daughter and patient has had were answered to   their satisfaction. Home O2 evaluation was done. Supplemental oxygen was not indicated  We will discontinue the oxygen but patient will continue to monitor the pulse ox and report to me if it is less than 89%  Pulmonary function tests were ordered  Chest x-ray was reviewed and discussed. CT scan of the chest was reviewed and discussed  After reviewing the patient's smoking history and his age patient does not meet the criteria for lung cancer screening. Recommended using BiPAP with a pressure of 16/12 centimeters of water for at least 4 hours every night  Use humidification if needed  Patient to follow good sleep hygiene instructions  Patient and his daughter were explained importance of compliance with BiPAP therapy  We'll see the patient back in 3 months or earlier if needed. Patient will call us if he is sick, so he can be seen sooner. Thank you for having us involved in the care of your patient.  Please

## 2021-09-07 ENCOUNTER — TELEPHONE (OUTPATIENT)
Dept: PRIMARY CARE CLINIC | Age: 81
End: 2021-09-07

## 2021-09-07 RX ORDER — ACETAMINOPHEN 500 MG
1000 TABLET ORAL 3 TIMES DAILY
Qty: 180 TABLET | Refills: 0 | Status: SHIPPED | OUTPATIENT
Start: 2021-09-07 | End: 2021-10-04

## 2021-09-07 NOTE — TELEPHONE ENCOUNTER
Pt's daughter calling Elton Rodriguez). The last pain med you prescribed (Tramadol) helped pt's pain level, but made pt very agitated and angry. Pt has been off it, since fri and no longer agitated, but in pain. Asking, if you would send in something else, for his arthritis pain. Uses [facility] [address].

## 2021-09-08 NOTE — PATIENT INSTRUCTIONS
Faxed over progress notes and dme order to [facility]  Called patient to let them know it was faxed over [facility]

## 2021-09-20 ENCOUNTER — TELEPHONE (OUTPATIENT)
Dept: PRIMARY CARE CLINIC | Age: 81
End: 2021-09-20

## 2021-09-20 NOTE — TELEPHONE ENCOUNTER
Daughter Jenn called, wanted to let you know that pt has a valve blockage and is having surgery Thursday.

## 2021-09-23 ENCOUNTER — HOSPITAL ENCOUNTER (OUTPATIENT)
Dept: CARDIAC CATH/INVASIVE PROCEDURES | Age: 81
Discharge: HOME | End: 2021-09-23
Payer: MEDICARE

## 2021-09-23 VITALS
RESPIRATION RATE: 14 BRPM | HEART RATE: 58 BPM | WEIGHT: 241 LBS | BODY MASS INDEX: 37.83 KG/M2 | HEIGHT: 67 IN | TEMPERATURE: 97.6 F | DIASTOLIC BLOOD PRESSURE: 79 MMHG | SYSTOLIC BLOOD PRESSURE: 159 MMHG | OXYGEN SATURATION: 95 %

## 2021-09-23 LAB
ACTIVATED CLOTTING TIME: 312 SEC (ref 79–149)
GFR NON-AFRICAN AMERICAN: >60 ML/MIN
GFR SERPL CREATININE-BSD FRML MDRD: >60 ML/MIN
GFR SERPL CREATININE-BSD FRML MDRD: NORMAL ML/MIN/{1.73_M2}
GLUCOSE BLD-MCNC: 112 MG/DL (ref 74–100)
PLATELET # BLD: 214 K/UL (ref 138–453)
POC BUN: 15 MG/DL (ref 8–26)
POC CHLORIDE: 108 MMOL/L (ref 98–107)
POC CREATININE: 0.79 MG/DL (ref 0.51–1.19)
POC HEMATOCRIT: 44 % (ref 41–53)
POC HEMOGLOBIN: 14.8 G/DL (ref 13.5–17.5)
POC POTASSIUM: 4.6 MMOL/L (ref 3.5–4.5)
POC SODIUM: 143 MMOL/L (ref 138–146)

## 2021-09-23 PROCEDURE — C1874 STENT, COATED/COV W/DEL SYS: HCPCS

## 2021-09-23 PROCEDURE — 2709999900 HC NON-CHARGEABLE SUPPLY

## 2021-09-23 PROCEDURE — 82435 ASSAY OF BLOOD CHLORIDE: CPT

## 2021-09-23 PROCEDURE — 7100000001 HC PACU RECOVERY - ADDTL 15 MIN

## 2021-09-23 PROCEDURE — 6360000004 HC RX CONTRAST MEDICATION

## 2021-09-23 PROCEDURE — 84520 ASSAY OF UREA NITROGEN: CPT

## 2021-09-23 PROCEDURE — 85014 HEMATOCRIT: CPT

## 2021-09-23 PROCEDURE — 6360000002 HC RX W HCPCS

## 2021-09-23 PROCEDURE — 99152 MOD SED SAME PHYS/QHP 5/>YRS: CPT

## 2021-09-23 PROCEDURE — 84132 ASSAY OF SERUM POTASSIUM: CPT

## 2021-09-23 PROCEDURE — C1894 INTRO/SHEATH, NON-LASER: HCPCS

## 2021-09-23 PROCEDURE — C1887 CATHETER, GUIDING: HCPCS

## 2021-09-23 PROCEDURE — 7100000000 HC PACU RECOVERY - FIRST 15 MIN

## 2021-09-23 PROCEDURE — C1769 GUIDE WIRE: HCPCS

## 2021-09-23 PROCEDURE — C1725 CATH, TRANSLUMIN NON-LASER: HCPCS

## 2021-09-23 PROCEDURE — 2500000003 HC RX 250 WO HCPCS

## 2021-09-23 PROCEDURE — 82565 ASSAY OF CREATININE: CPT

## 2021-09-23 PROCEDURE — 84295 ASSAY OF SERUM SODIUM: CPT

## 2021-09-23 PROCEDURE — 85049 AUTOMATED PLATELET COUNT: CPT

## 2021-09-23 PROCEDURE — 6370000000 HC RX 637 (ALT 250 FOR IP)

## 2021-09-23 PROCEDURE — C9600 PERC DRUG-EL COR STENT SING: HCPCS | Performed by: INTERNAL MEDICINE

## 2021-09-23 PROCEDURE — 82947 ASSAY GLUCOSE BLOOD QUANT: CPT

## 2021-09-23 PROCEDURE — 85347 COAGULATION TIME ACTIVATED: CPT

## 2021-09-23 PROCEDURE — 93458 L HRT ARTERY/VENTRICLE ANGIO: CPT | Performed by: INTERNAL MEDICINE

## 2021-09-23 RX ORDER — SODIUM CHLORIDE 0.9 % (FLUSH) 0.9 %
5-40 SYRINGE (ML) INJECTION EVERY 12 HOURS SCHEDULED
Status: DISCONTINUED | OUTPATIENT
Start: 2021-09-23 | End: 2021-09-24 | Stop reason: HOSPADM

## 2021-09-23 RX ORDER — SODIUM CHLORIDE 9 MG/ML
INJECTION, SOLUTION INTRAVENOUS CONTINUOUS
Status: DISCONTINUED | OUTPATIENT
Start: 2021-09-23 | End: 2021-09-24 | Stop reason: HOSPADM

## 2021-09-23 RX ORDER — SODIUM CHLORIDE 9 MG/ML
25 INJECTION, SOLUTION INTRAVENOUS PRN
Status: DISCONTINUED | OUTPATIENT
Start: 2021-09-23 | End: 2021-09-24 | Stop reason: HOSPADM

## 2021-09-23 RX ORDER — ASPIRIN 81 MG/1
81 TABLET, CHEWABLE ORAL DAILY
Qty: 30 TABLET | Refills: 3 | Status: SHIPPED | OUTPATIENT
Start: 2021-09-23 | End: 2022-03-15 | Stop reason: SDUPTHER

## 2021-09-23 RX ORDER — ACETAMINOPHEN 325 MG/1
650 TABLET ORAL EVERY 4 HOURS PRN
Status: DISCONTINUED | OUTPATIENT
Start: 2021-09-23 | End: 2021-09-24 | Stop reason: HOSPADM

## 2021-09-23 RX ORDER — FENTANYL CITRATE 50 UG/ML
25 INJECTION, SOLUTION INTRAMUSCULAR; INTRAVENOUS ONCE
Status: COMPLETED | OUTPATIENT
Start: 2021-09-23 | End: 2021-09-23

## 2021-09-23 RX ORDER — SODIUM CHLORIDE 0.9 % (FLUSH) 0.9 %
5-40 SYRINGE (ML) INJECTION PRN
Status: DISCONTINUED | OUTPATIENT
Start: 2021-09-23 | End: 2021-09-24 | Stop reason: HOSPADM

## 2021-09-23 RX ADMIN — SODIUM CHLORIDE: 9 INJECTION, SOLUTION INTRAVENOUS at 13:29

## 2021-09-23 RX ADMIN — FENTANYL CITRATE 25 MCG: 50 INJECTION, SOLUTION INTRAMUSCULAR; INTRAVENOUS at 15:12

## 2021-09-23 ASSESSMENT — PAIN SCALES - GENERAL: PAINLEVEL_OUTOF10: 4

## 2021-09-23 NOTE — PROGRESS NOTES
Patient received post cath to [facility] room 6. Assessment obtained. Post cath pathway initiated. Right radial site with TR band inflated with 13 mL of air intact. No hematoma noted. Patient without complaints.

## 2021-09-23 NOTE — OP NOTE
[facility]    CARDIAC CATHETERIZATION    Date:   9/23/2021  Patient name:  Sandy Burr  Date of admission:  9/23/2021 12:58 PM  MRN:   [id number]  YOB: 1940    Operators:  Primary:   Chriss Schrader MD (Attending Physician)      Procedure performed:      [x] Left Heart Catheterization. [] Graft Angiography. [x] Left Ventriculography. [] Right Heart Catheterization. [] Coronary Angiography. [] Aortic Valve Studies. [] PCI:      [] Other:       Pre Procedure Conscious Sedation Data:  ASA Class:    [] I [] II [x] III [] IV    Mallampati Class:  [x] I [] II [] III [] IV      Indication:  [] STEMI      [x] + Stress test  [] ACS      [] + EKG Changes  [] Non Q MI       [] Significant Risk Factors  [] Recurrent Angina             [] Diabetes Mellitus    [] New LBBB      [] Uncontrolled HTN. [] CHF / Low EF changes     [] Abnormal CTA / Ca Score      Procedure:  Access:  [] Femoral  [x] Radial  artery       [x] Right  [] Left    Procedure: After informed consent was obtained with explanation of the risks and benefits, patient was brought to the cath lab. The access area was prepped and draped in sterile fashion. 1% lidocaine was used for local block. The artery was cannulated with 6  Fr sheath with brisk arterial blood return. The side port was frequently flushed and aspirated with normal saline. Findings:  LMCA: Normal 0% stenosis. LAD: Patent mid stent area with new stenosis 90% distal to stent   Distal area 70% stenosis   D1: Large with ostial 40% stenosis         Lesion on Mid LAD: Mid subsection. 95% stenosis 8 mm length reduced to 0%. Pre procedure ALISHA II flow was noted. Post Procedure ALISHA III flow was     present. Good runoff was present. The lesion was diagnosed as Moderate     Risk (B). Devices used         - Luge Wire 182 cm. Number of passes: 1.         - Trek Balloon 2.5mm x 12mm. 2 inflation(s) to a max pressure of: 12     markel.          - Xience Merry 3.0 x 23 FREDDY. 1 inflation(s) to a max pressure of: 12     markel. Lesion on Dist LAD: Proximal subsection. 75% stenosis 5 mm length reduced     to 0%. Pre procedure ALISHA III flow was noted. Post Procedure ALISHA III     flow was present. Good runoff was present. The lesion was diagnosed as     Moderate Risk (B). Devices used         - Trek Balloon 2.5mm x 12mm. 1 inflation(s) to a max pressure of: 6     markel. - [place] 2.5 x 15 FREDDY. 1 inflation(s) to a max pressure of: 12     markel. LCx: Mild irregularities 10-20%. RCA: Mild irregularities 10-20%. Coronary Tree        Dominance: Right         Estimated Blood Loss: 5 mL      ____________________________________________________________________    History and Risk Factors    [x] Hypertension     [] Family history of CAD  [x] Hyperlipidemia     [] Cerebrovascular Disease   [] Prior MI       [] Peripheral Vascular disease   [] Prior PCI              [] Diabetes Mellitus    [] Left Main PCI. [] Currently on Dialysis. [] Prior CABG. [] Currently smoker. [] Cardiac Arrest outside of healthcare facility. [] Yes    [] No        Witnessed     [] Yes   [] No     Arrest after arrival of EMS  [] Yes   [] No     [] Cardiac Arrest at other Facility. [] Yes   [] No    Pre-Procedure Information. Heart Failure       [] Yes    [x] No        Class  [] I      [] II  [] III    [] IV. New Diagnosis    [] Yes  [] No    HF Type      [] Systolic   [] Diastolic          [] Unknown. Diagnostic Test:   EKG       [x] Normal   [] Abnormal    New antiarrhythmia medications:    [] Yes   [] No   New onset atrial fibrillation / Flutter     [] Yes   [] No   ECG Abnormalities:      [] V. Fib   [] Elizabeth V. Tach           [] NS V. T   [] New LBBB           [] T.  Inv  []  ST dev > 0.5 mm         [] PVC's freq  [] PVC's infrequent    Stress Test Performed:      [x] Yes    [] No     Type:     [] Stress Echo   [] Exercise Stress Test (no imaging)      [x] Stress Nuclear  [] Stress Imaging     Results   [] Negative   [x] Positive        [] Indeterminate  [] Unavailable     If Positive/ Risk / Extent of Ischemia:       [] Low  [] Intermediate         [] High  [] Unavailable      Cardiac CTA Performed:     [] Yes    [x] No      Results   [] CAD   [] Non obstructive CAD      [] No CAD   [] Uncertain      [] Unknown   [] Structural Disease. Pre Procedure Medications:   [x] Yes    [] No         [x] ASA   [] Beta Blockers      [] Nitrate   [] Ca Channel Blockers      [] Ranolazine   [] Statin       [] Plavix/Others antiplatelets      Electronically signed on 9/23/2021 at 3:20 PM by:    Alane Gottron, MD  Fellow, [address]       I have reviewed the case / procedure with resident / fellow  I have examined the patient personally  Patient agree with treatment plan as discussed before, final arrangement based on my evaluation and exam.    Risk and benefit of procedure planned were explained in details. Procedure was performed by me personally, with all aspect of the procedure being done using standard protocol. Note was modified based on my own assessment and treatment.     Pj Reyes MD  [facility]
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
## 2021-09-23 NOTE — PROGRESS NOTES
[] DISCHARGE INSTRUCTIONS GIVEN WITH 2 WEEK APPT. MADE AND . DOCUMENTED Patient to make own appointment  [x] STENT/PCI GUIDELINES GIVEN    [x] ASA  PRESCRIBED POST PROCEDURE    [x] WAS A BETA BLOCKER ORDERED IF YES WAS SCRIPT GIVEN TO PT.    [x] IF EF < 45 % WAS AN ACE INHIBITOR ORDERED    [x] WAS PLAVIX,EFFIENT,BRILINTA, ORDERED IF YES WAS SCRIPT GIVEN TO       PT,AND INSTRUCTIONS ON IMPORTANCE OF MED    [x] DOES PT. NEED 30 & 90 DAY SCRIPTS    [x] CONCERNS ON PURCHASE OF ANTIPLATELETS IF YES.  SEE PROCEDURE ON      PROCESS FOR PT.S TO OBTAIN THESE MEDS    [x] IS PT. ON STATINS IF NO WERE STATINS ORDERED AND SCRIPT GIVEN    [x] WERE MEDS RECONCILED, WAS [employer] INFORMATION ON MEDS GIVEN     TO PT.    [x] PRINT DISCHARGE MED LIST CHECK AVS FOR CURRENT MEDS    [x] WAS STENT CARD GIVEN TO PT.

## 2021-09-23 NOTE — PROGRESS NOTES
Dr Phoebe Kiran notified that patient is nervous which can cause him to get a migraine. Order for 25 mcg Fentynal received.

## 2021-09-24 NOTE — PROGRESS NOTES
Air removed fromVasc band in  2 mL increments until all air removed. No bleeding or hematoma noted. Pressure dressing and armboard applied, radial pulse palpable. Ambulated to bathroom and in halls. Gait steady. .     [facility], paper signed and given copy. Patient discharged per wheelchair with son-in-law and belongings.

## 2021-10-01 DIAGNOSIS — G89.29 CHRONIC NONINTRACTABLE HEADACHE, UNSPECIFIED HEADACHE TYPE: ICD-10-CM

## 2021-10-01 DIAGNOSIS — R51.9 CHRONIC NONINTRACTABLE HEADACHE, UNSPECIFIED HEADACHE TYPE: ICD-10-CM

## 2021-10-03 DIAGNOSIS — E11.9 TYPE 2 DIABETES MELLITUS WITHOUT COMPLICATION, WITHOUT LONG-TERM CURRENT USE OF INSULIN (HCC): ICD-10-CM

## 2021-10-04 RX ORDER — PSEUDOEPHED/ACETAMINOPH/DIPHEN 30MG-500MG
TABLET ORAL
Qty: 180 TABLET | Refills: 0 | Status: SHIPPED | OUTPATIENT
Start: 2021-10-04 | End: 2021-11-02

## 2021-10-04 RX ORDER — BUTALBITAL, ACETAMINOPHEN, CAFFEINE AND CODEINE PHOSPHATE 50; 325; 40; 30 MG/1; MG/1; MG/1; MG/1
CAPSULE ORAL
Qty: 60 CAPSULE | Refills: 0 | Status: SHIPPED | OUTPATIENT
Start: 2021-10-04 | End: 2021-12-13 | Stop reason: SDUPTHER

## 2021-10-04 RX ORDER — FLASH GLUCOSE SENSOR
KIT MISCELLANEOUS
Qty: 2 EACH | Refills: 5 | Status: SHIPPED | OUTPATIENT
Start: 2021-10-04 | End: 2021-11-02

## 2021-11-29 RX ORDER — PSEUDOEPHED/ACETAMINOPH/DIPHEN 30MG-500MG
TABLET ORAL
Qty: 180 TABLET | Refills: 0 | Status: SHIPPED | OUTPATIENT
Start: 2021-11-29 | End: 2021-12-02

## 2021-12-02 ENCOUNTER — OFFICE VISIT (OUTPATIENT)
Dept: PULMONOLOGY | Age: 81
End: 2021-12-02
Payer: MEDICARE

## 2021-12-02 VITALS
DIASTOLIC BLOOD PRESSURE: 86 MMHG | OXYGEN SATURATION: 94 % | HEIGHT: 68 IN | TEMPERATURE: 98.7 F | SYSTOLIC BLOOD PRESSURE: 128 MMHG | BODY MASS INDEX: 34.1 KG/M2 | HEART RATE: 77 BPM | WEIGHT: 225 LBS

## 2021-12-02 DIAGNOSIS — J44.9 CHRONIC OBSTRUCTIVE PULMONARY DISEASE, UNSPECIFIED COPD TYPE (HCC): Primary | ICD-10-CM

## 2021-12-02 DIAGNOSIS — E11.9 TYPE 2 DIABETES MELLITUS WITHOUT COMPLICATION, WITHOUT LONG-TERM CURRENT USE OF INSULIN (HCC): ICD-10-CM

## 2021-12-02 DIAGNOSIS — E03.9 HYPOTHYROIDISM, UNSPECIFIED TYPE: ICD-10-CM

## 2021-12-02 DIAGNOSIS — J45.30 MILD PERSISTENT ASTHMA WITHOUT COMPLICATION: ICD-10-CM

## 2021-12-02 DIAGNOSIS — E66.01 SEVERE OBESITY (BMI 35.0-35.9 WITH COMORBIDITY) (HCC): ICD-10-CM

## 2021-12-02 DIAGNOSIS — I25.10 CORONARY ARTERY DISEASE INVOLVING NATIVE CORONARY ARTERY OF NATIVE HEART WITHOUT ANGINA PECTORIS: ICD-10-CM

## 2021-12-02 DIAGNOSIS — I50.32 CHRONIC DIASTOLIC (CONGESTIVE) HEART FAILURE (HCC): ICD-10-CM

## 2021-12-02 DIAGNOSIS — G47.33 OBSTRUCTIVE SLEEP APNEA SYNDROME: ICD-10-CM

## 2021-12-02 PROCEDURE — 94729 DIFFUSING CAPACITY: CPT | Performed by: INTERNAL MEDICINE

## 2021-12-02 PROCEDURE — 99214 OFFICE O/P EST MOD 30 MIN: CPT | Performed by: INTERNAL MEDICINE

## 2021-12-02 PROCEDURE — 4040F PNEUMOC VAC/ADMIN/RCVD: CPT | Performed by: INTERNAL MEDICINE

## 2021-12-02 PROCEDURE — 94010 BREATHING CAPACITY TEST: CPT | Performed by: INTERNAL MEDICINE

## 2021-12-02 PROCEDURE — 3023F SPIROM DOC REV: CPT | Performed by: INTERNAL MEDICINE

## 2021-12-02 PROCEDURE — G8417 CALC BMI ABV UP PARAM F/U: HCPCS | Performed by: INTERNAL MEDICINE

## 2021-12-02 PROCEDURE — 1123F ACP DISCUSS/DSCN MKR DOCD: CPT | Performed by: INTERNAL MEDICINE

## 2021-12-02 PROCEDURE — G8427 DOCREV CUR MEDS BY ELIG CLIN: HCPCS | Performed by: INTERNAL MEDICINE

## 2021-12-02 PROCEDURE — 1036F TOBACCO NON-USER: CPT | Performed by: INTERNAL MEDICINE

## 2021-12-02 PROCEDURE — G8482 FLU IMMUNIZE ORDER/ADMIN: HCPCS | Performed by: INTERNAL MEDICINE

## 2021-12-02 RX ORDER — PAROXETINE 10 MG/1
TABLET, FILM COATED ORAL
COMMUNITY
Start: 2021-11-02 | End: 2022-03-15 | Stop reason: ALTCHOICE

## 2021-12-02 RX ORDER — PSEUDOEPHED/ACETAMINOPH/DIPHEN 30MG-500MG
TABLET ORAL
Qty: 180 TABLET | Refills: 0 | Status: SHIPPED | OUTPATIENT
Start: 2021-12-02 | End: 2021-12-03

## 2021-12-02 RX ORDER — FLASH GLUCOSE SENSOR
KIT MISCELLANEOUS
Qty: 6 EACH | Refills: 0 | Status: SHIPPED | OUTPATIENT
Start: 2021-12-02 | End: 2022-01-03

## 2021-12-02 NOTE — PROGRESS NOTES
Suboptimal effort for flow volume loop, difficulty with diffusion unable to do body box. Transcutaneous Hb 14. 3.

## 2021-12-03 ENCOUNTER — TELEPHONE (OUTPATIENT)
Dept: PRIMARY CARE CLINIC | Age: 81
End: 2021-12-03

## 2021-12-03 ENCOUNTER — TELEPHONE (OUTPATIENT)
Dept: PULMONOLOGY | Age: 81
End: 2021-12-03

## 2021-12-03 RX ORDER — PSEUDOEPHED/ACETAMINOPH/DIPHEN 30MG-500MG
TABLET ORAL
Qty: 180 TABLET | Refills: 0 | Status: SHIPPED | OUTPATIENT
Start: 2021-12-03 | End: 2022-03-03

## 2021-12-03 NOTE — TELEPHONE ENCOUNTER
Jenn[facility], asked that the orders and last visit note from pts last appt be faxed to her at [phone]. Thank you.

## 2021-12-03 NOTE — TELEPHONE ENCOUNTER
----- Message from Jj Bunn sent at 12/3/2021 10:30 AM EST -----  Subject: Message to Provider    QUESTIONS  Information for Provider? Patient's daughter is calling to schedule a   booster for patient at the office. She is very upset and only wants to   speak to the practice for scheduling. Attempted to contact the practice   and she said she would not hold and only wants a call back from the   practice. Attempted to call practice and there was no answer. ---------------------------------------------------------------------------  --------------  Tessa Diaz INFO  What is the best way for the office to contact you? Do not leave any   message, patient will call back for answer  Preferred Call Back Phone Number? [phone]  ---------------------------------------------------------------------------  --------------  SCRIPT ANSWERS  Relationship to Patient? Other  Representative Name? Vivienne Luo  Is the Representative on the appropriate HIPAA document in Epic?  Yes

## 2021-12-04 NOTE — PROGRESS NOTES
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

## 2021-12-07 ENCOUNTER — IMMUNIZATION (OUTPATIENT)
Dept: PRIMARY CARE CLINIC | Age: 81
End: 2021-12-07
Payer: MEDICARE

## 2021-12-07 DIAGNOSIS — Z23 NEED FOR VACCINATION: Primary | ICD-10-CM

## 2021-12-07 PROCEDURE — 91300 COVID-19, PFIZER VACCINE 30MCG/0.3ML DOSE: CPT | Performed by: PHYSICIAN ASSISTANT

## 2021-12-07 PROCEDURE — 0004A COVID-19, PFIZER VACCINE 30MCG/0.3ML DOSE: CPT | Performed by: PHYSICIAN ASSISTANT

## 2021-12-07 NOTE — PROGRESS NOTES
After obtaining consent, and per orders of Lui Ron, injection of covid booster given in Left deltoid by Johnnie Coronel MA. Patient instructed to remain in clinic for 15 minutes afterwards, and to report any adverse reaction to me immediately. Patient reported no reactions after 15 minutes and tolerated well.

## 2021-12-13 DIAGNOSIS — R51.9 CHRONIC NONINTRACTABLE HEADACHE, UNSPECIFIED HEADACHE TYPE: ICD-10-CM

## 2021-12-13 DIAGNOSIS — G89.29 CHRONIC NONINTRACTABLE HEADACHE, UNSPECIFIED HEADACHE TYPE: ICD-10-CM

## 2021-12-13 RX ORDER — BUTALBITAL, ACETAMINOPHEN, CAFFEINE AND CODEINE PHOSPHATE 50; 325; 40; 30 MG/1; MG/1; MG/1; MG/1
CAPSULE ORAL
Qty: 60 CAPSULE | Refills: 0 | Status: SHIPPED | OUTPATIENT
Start: 2021-12-13 | End: 2022-03-13

## 2021-12-13 NOTE — TELEPHONE ENCOUNTER
LAST VISIT:   8/19/2021     Future Appointments   Date Time Provider Becky Graham   5/24/2022  1:45 PM Shashi Saleh [place] [facility] [facility]

## 2021-12-13 NOTE — PROGRESS NOTES
PULMONARY outpatient progress note        REFERRED BY: Royal Bryson PA-C    REASON FOR CONSULTATION: Asthma, COPD, sleep apnea    Patient is being seen in follow-up for-  1. Chronic obstructive pulmonary disease, unspecified COPD type ([facility] [address]    2. Obstructive sleep apnea syndrome    3. Mild persistent asthma without complication    4. Severe obesity (BMI 35.0-35.9 with comorbidity) ([facility] [address]    5. Coronary artery disease involving native coronary artery of native heart without angina pectoris    6. Hypothyroidism, unspecified type    7.  Chronic diastolic (congestive) heart failure (HCC)          HISTORY OF PRESENT ILLNESS:    Jeri Villasenor is a [de-identified]y.o. year old male here for evaluation of above problems  Denied any shortness of breath or wheezing  Not much cough or sputum production  No hemoptysis  No orthopnea or PND  No chest pain or pressure  No palpitations  No increasing pedal edema  Patient has daytime sleepiness and fatigue and tiredness  Had been diagnosed with sleep apnea in the past  Had a cardiac stent since last visit  Patient has been monitoring his pulse oximetry has been more than 97% at all times      PAST MEDICAL HISTORY:       Diagnosis Date    Depression     Hyperlipidemia     Hypertension     Hypothyroidism        SURGICAL HISTORY:    Past Surgical History:   Procedure Laterality Date    CARDIAC CATHETERIZATION  09/23/2021    CORONARY ANGIOPLASTY WITH STENT PLACEMENT  2013    EYE SURGERY      as a child       ALLERGIES:    Allergies   Allergen Reactions    Amiodarone Other (See Comments)     hallucinations       MEDICATIONS:   Current Outpatient Medications   Medication Sig Dispense Refill    PARoxetine (PAXIL) 10 MG tablet TAKE 1 TABLET BY MOUTH EVERY DAY      butalbital-acetaminophen-caffeine-codeine (FIORICET WITH CODEINE) -11-30 MG per capsule TAKE 1 CAPSULE BY MOUTH EVERY EIGHT HOURS AS NEEDED FOR HEADACHE 60 capsule 0    aspirin (ASPIRIN CHILDRENS) 81 MG chewable tablet Take 1 tablet by mouth daily 30 tablet 3    budesonide (PULMICORT) 0.5 MG/2ML nebulizer suspension Take 2 mLs by nebulization 2 times daily 180 ampule 1    albuterol (PROVENTIL) (2.5 MG/3ML) 0.083% nebulizer solution INHALE THE CONTENTS OF 1 VIAL EVERY 8 HOURS AS NEEDED      metFORMIN (GLUCOPHAGE) 500 MG tablet Take 1 tablet by mouth daily (with breakfast) 180 tablet 1    levothyroxine (SYNTHROID) 137 MCG tablet TAKE ONE TABLET BY MOUTH ONE TIME DAILY 90 tablet 3    tamsulosin (FLOMAX) 0.4 MG capsule TAKE 1 CAPSULE EVERY EVENING 90 capsule 3    Continuous Blood Gluc  (FREESTYLE MARSHAL 14 DAY READER) SAVANNAH Testing BID. 1 Device 0    Alcohol Swabs PADS For any brand insurance will cover. TEST  each 11    Blood Glucose Monitoring Suppl MISC For any brand insurance will cover. GLUCOMETER. TEST BID. 1 each 0    blood glucose test strips (ASCENSIA AUTODISC VI;ONE TOUCH ULTRA TEST VI) strip For any brand insurance will cover. TEST  strip 11    carvedilol (COREG) 6.25 MG tablet Take 1 tablet by mouth 2 times daily   3    Omega-3 Fatty Acids (FISH OIL OMEGA-3 PO) Take by mouth      clopidogrel (PLAVIX) 75 MG tablet Take 1 tablet by mouth daily 90 tablet 1    losartan (COZAAR) 100 MG tablet Take 1 tablet by mouth 2 times daily 180 tablet 1    Multiple Vitamins-Minerals (ONE DAILY MENS) TABS Take 1 tablet by mouth daily      ACETAMINOPHEN EXTRA STRENGTH 500 MG tablet TAKE 2 TABLETS BY MOUTH THREE TIMES A  tablet 0    Continuous Blood Gluc Sensor (FREESTYLE MARSHAL 14 DAY SENSOR) [facility] APPLY ONE SENSOR TO THE UPPER ARM ONCE EVERY 14 DAYS 6 each 0    sertraline (ZOLOFT) 100 MG tablet TAKE ONE TABLET BY MOUTH ONE TIME DAILY 90 tablet 3     No current facility-administered medications for this visit. FAMILY HISTORY: family history includes Cancer in his mother and another family member; Diabetes in his brother and maternal uncle;  Heart Disease in his brother and maternal uncle; High Blood Pressure in his brother. SOCIAL AND OCCUPATIONAL HEALTH:  The patient is a non-smoker but had exposure to secondhand smoke growing up in a household with smokers. There  is not history of TB or TB exposure. There is not asbestos or silica dust exposure. The patient reports does not have coal, foundry, quarry or [employer] exposure. Travel history reveals no significant history of risk factors for pulm disease. There is not  history of recreational or IV drug use. The patient does not have pets, dogs, cats turtles or exotic birds.         Review of Systems:  Review of Systems -   General ROS: Completed and except as mentioned above were negative   Psychological ROS:  Completed and except as mentioned above were negative  Ophthalmic ROS:  Completed and except as mentioned above were negative  ENT ROS:  Completed and except as mentioned above were negative  Allergy and Immunology ROS:  Completed and except as mentioned above were negative  Hematological and Lymphatic ROS:  Completed and except as mentioned above were negative  Endocrine ROS: Completed and except as mentioned above were negative  Breast ROS:  Completed and except as mentioned above were negative  Respiratory ROS:  Completed and except as mentioned above were negative  Cardiovascular ROS:  Completed and except as mentioned above were negative  Gastrointestinal ROS: Completed and except as mentioned above were negative  Genito-Urinary ROS:  Completed and except as mentioned above were negative  Musculoskeletal ROS:  Completed and except as mentioned above were negative  Neurological ROS:  Completed and except as mentioned above were negative  Dermatological ROS:  Completed and except as mentioned above were negative        PHYSICAL EXAMINATION:  Vitals:    12/02/21 1328   BP: 128/86   Site: Right Upper Arm   Position: Sitting   Cuff Size: Large Adult   Pulse: 77   Temp: 98.7 °F (37.1 °C)   TempSrc: Temporal   SpO2: 94% Weight: 225 lb (102.1 kg)   Height: 5' 8\" (1.727 m)     PHYSICAL EXAMINATION:  Vitals:    12/02/21 1328   BP: 128/86   Site: Right Upper Arm   Position: Sitting   Cuff Size: Large Adult   Pulse: 77   Temp: 98.7 °F (37.1 °C)   TempSrc: Temporal   SpO2: 94%   Weight: 225 lb (102.1 kg)   Height: 5' 8\" (1.727 m)     Constitutional: This is a well developed, well nourished, 35-39.9 - Obesity Grade II [de-identified]y.o. year old male who is alert, oriented, cooperative and in no apparent distress. Head:normocephalic and atraumatic. EENT:  ELENI. No conjunctival injections. Septum was midline, mucosa was without erythema, exudates or cobblestoning. No thrush was noted. MallampatiIII (soft palate, base of uvula visible)  Neck: Supple without thyromegaly. No elevated JVP. Trachea was midline. Respiratory: Chest was symmetrical without dullness to percussion. Breath sounds bilaterally were clear to auscultation. There were no wheezes, rhonchi or rales. There is no intercostal retraction or use of accessory muscles. No egophony noted. Cardiovascular: Regular without murmur, clicks, gallops or rubs. Abdomen: Slightly rounded and soft without organomegaly. No rebound, rigidity or guarding was appreciated. Lymphatic: No lymphadenopathy. Musculoskeletal: Normal curvature of the spine. No gross muscle weakness. Extremities:  does not have lower extremity edema,  no ulcerations, tenderness, varicosities or erythema. Muscle size, tone and strength are normal.  No involuntary movements are noted. Skin:  Warm and dry. Good color, turgor and pigmentation. No lesions or scars.   No cyanosis or clubbing  Neurological/Psychiatric: The patient's general behavior, level of consciousness, thought content and emotional status is normal.          DATA:     Pulmonary function tests: none and were ordered        CXR: REVIEWED: In July 2021 patient had a film at the [facility] clinic that was without any acute problems    CT scan of the chest in 2018 without any acute abnormalities    PFTs were done on December 2 of 2021 and they showed extrapulmonic restrictive ventilatory defect      IMPRESSION:   1. Chronic obstructive pulmonary disease, unspecified COPD type ([facility] [address]    2. Obstructive sleep apnea syndrome    3. Mild persistent asthma without complication    4. Severe obesity (BMI 35.0-35.9 with comorbidity) ([facility] [address]    5. Coronary artery disease involving native coronary artery of native heart without angina pectoris    6. Hypothyroidism, unspecified type    7. Chronic diastolic (congestive) heart failure (HCC)                   PLAN:       Reviewed pulmonary function test with the patient  Refills were provided -- Auto BiPAP, nebulizer machine  Patient was recommended to have prednisone and an antibiotic available for use during an exacerbation  Educated and clarified the medication use. Discussed use, benefit, and side effects of prescribed medications. Barriers to medication compliance addressed. Delight Kehr received counseling on the following healthy behaviors: nutrition, exercise and medication adherence  Recommend flu vaccination in the fall annually. Recommendations given regarding pneumococcal vaccinations. Patient had the COVID-19 vaccination  Patient is uptodate with vaccinations from pulmonary perspective. Maintain an active lifestyle. Recommend pulmonary rehabilitation. Patient was explained importance of pulmonary rehabilitation with regards to decreasing the hospitalization risk and also help with his dyspnea  Patient was educated on how to use the respiratory medications. All the questions that the patient has had were answered to   his satisfaction. Home O2 evaluation was done.   Supplemental oxygen was not indicated  We will discontinue the oxygen but patient will continue to monitor the pulse ox and report to me if it is less than 89%  Pulmonary function tests were ordered  Chest x-ray was reviewed and discussed. CT scan of the chest was reviewed and discussed  After reviewing the patient's smoking history and his age patient does not meet the criteria for lung cancer screening. Recommended using auto BiPAP with a average pressure of 16/12 centimeters of water for at least 4 hours every night  Use humidification if needed  Patient to follow good sleep hygiene instructions  Patient was importance of compliance with BiPAP therapy  We'll see the patient back in 6 months or earlier if needed. Patient will call us if he is sick, so he can be seen sooner. Thank you for having us involved in the care of your patient. Please call us if you have any questions or concerns.               Shana Dela Cruz MD MD  12/12/2021 9:56 PM

## 2022-01-13 ENCOUNTER — TELEPHONE (OUTPATIENT)
Dept: PRIMARY CARE CLINIC | Age: 82
End: 2022-01-13

## 2022-01-13 RX ORDER — CEPHALEXIN 500 MG/1
500 CAPSULE ORAL 3 TIMES DAILY
Qty: 30 CAPSULE | Refills: 0 | Status: SHIPPED | OUTPATIENT
Start: 2022-01-13 | End: 2022-01-23

## 2022-01-13 NOTE — TELEPHONE ENCOUNTER
Alice Dey called in and stated patients right leg is red and swollen with sores on it. Patient has begun dragging his leg when walking - she states this started 2 days ago. Appointment offered for tomorrow morning & was denied by patients daughter.     Patient scheduled for Monday morning

## 2022-01-18 RX ORDER — BUDESONIDE 0.5 MG/2ML
INHALANT ORAL
Qty: 120 ML | Refills: 10 | Status: SHIPPED | OUTPATIENT
Start: 2022-01-18 | End: 2022-01-27

## 2022-01-18 NOTE — TELEPHONE ENCOUNTER
Dr Roland Aragon, patient is current and is scheduled for follow up on 5/24/22. No mention of this medication in your last dictation but you have prescribed in the past. Please sign for refill if ok. Thank you.

## 2022-01-27 RX ORDER — BUDESONIDE 0.5 MG/2ML
INHALANT ORAL
Qty: 180 ML | Refills: 0 | Status: SHIPPED | OUTPATIENT
Start: 2022-01-27 | End: 2022-06-08

## 2022-03-03 DIAGNOSIS — E11.9 TYPE 2 DIABETES MELLITUS WITHOUT COMPLICATION, WITHOUT LONG-TERM CURRENT USE OF INSULIN (HCC): ICD-10-CM

## 2022-03-03 RX ORDER — FLASH GLUCOSE SENSOR
KIT MISCELLANEOUS
Qty: 6 EACH | Refills: 3 | Status: SHIPPED | OUTPATIENT
Start: 2022-03-03 | End: 2022-06-06

## 2022-03-03 RX ORDER — PSEUDOEPHED/ACETAMINOPH/DIPHEN 30MG-500MG
TABLET ORAL
Qty: 180 TABLET | Refills: 2 | Status: SHIPPED | OUTPATIENT
Start: 2022-03-03 | End: 2022-03-15 | Stop reason: SDUPTHER

## 2022-03-03 NOTE — TELEPHONE ENCOUNTER
LAST VISIT:   8/19/2021     Future Appointments   Date Time Provider Becky Graham   3/15/2022  2:15 PM Arabella Avendano [place] [facility] Fredis Garsia   5/24/2022  1:45 PM Domingo Reina MD [address] [facility]

## 2022-03-15 ENCOUNTER — OFFICE VISIT (OUTPATIENT)
Dept: PRIMARY CARE CLINIC | Age: 82
End: 2022-03-15
Payer: MEDICARE

## 2022-03-15 VITALS
SYSTOLIC BLOOD PRESSURE: 138 MMHG | BODY MASS INDEX: 35.98 KG/M2 | DIASTOLIC BLOOD PRESSURE: 78 MMHG | HEIGHT: 68 IN | HEART RATE: 70 BPM | OXYGEN SATURATION: 98 % | WEIGHT: 237.4 LBS

## 2022-03-15 DIAGNOSIS — Z79.899 POLYPHARMACY: ICD-10-CM
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
## 2022-03-15 DIAGNOSIS — J44.9 CHRONIC OBSTRUCTIVE PULMONARY DISEASE, UNSPECIFIED COPD TYPE (HCC): Primary | ICD-10-CM

## 2022-03-15 DIAGNOSIS — E03.9 HYPOTHYROIDISM, UNSPECIFIED TYPE: ICD-10-CM

## 2022-03-15 DIAGNOSIS — M25.50 ARTHRALGIA, UNSPECIFIED JOINT: ICD-10-CM

## 2022-03-15 DIAGNOSIS — I50.32 CHRONIC DIASTOLIC (CONGESTIVE) HEART FAILURE (HCC): ICD-10-CM

## 2022-03-15 DIAGNOSIS — F32.5 MAJOR DEPRESSIVE DISORDER WITH SINGLE EPISODE, IN FULL REMISSION (HCC): ICD-10-CM

## 2022-03-15 DIAGNOSIS — G47.33 OSA ON CPAP: ICD-10-CM

## 2022-03-15 DIAGNOSIS — Z99.89 OSA ON CPAP: ICD-10-CM

## 2022-03-15 DIAGNOSIS — Z00.00 INITIAL MEDICARE ANNUAL WELLNESS VISIT: ICD-10-CM

## 2022-03-15 DIAGNOSIS — I25.10 CORONARY ARTERY DISEASE INVOLVING NATIVE CORONARY ARTERY OF NATIVE HEART WITHOUT ANGINA PECTORIS: ICD-10-CM

## 2022-03-15 LAB
ALBUMIN SERPL-MCNC: 4.4 G/DL (ref 3.5–5.2)
ALBUMIN/GLOBULIN RATIO: 1.4 (ref 1–2.5)
ALP BLD-CCNC: 53 U/L (ref 40–129)
ALT SERPL-CCNC: 27 U/L (ref 5–41)
AST SERPL-CCNC: 25 U/L
BILIRUB SERPL-MCNC: 0.31 MG/DL (ref 0.3–1.2)
BILIRUBIN DIRECT: 0.12 MG/DL
BILIRUBIN, INDIRECT: 0.19 MG/DL (ref 0–1)
GLOBULIN: NORMAL G/DL (ref 1.5–3.8)
THYROXINE, FREE: 1.59 NG/DL (ref 0.93–1.7)
TOTAL PROTEIN: 7.5 G/DL (ref 6.4–8.3)
TSH SERPL DL<=0.05 MIU/L-ACNC: 0.12 MIU/L (ref 0.3–5)

## 2022-03-15 PROCEDURE — 4040F PNEUMOC VAC/ADMIN/RCVD: CPT | Performed by: PHYSICIAN ASSISTANT

## 2022-03-15 PROCEDURE — G8482 FLU IMMUNIZE ORDER/ADMIN: HCPCS | Performed by: PHYSICIAN ASSISTANT

## 2022-03-15 PROCEDURE — G0438 PPPS, INITIAL VISIT: HCPCS | Performed by: PHYSICIAN ASSISTANT

## 2022-03-15 PROCEDURE — 1123F ACP DISCUSS/DSCN MKR DOCD: CPT | Performed by: PHYSICIAN ASSISTANT

## 2022-03-15 RX ORDER — ASPIRIN 81 MG/1
81 TABLET, CHEWABLE ORAL DAILY
Qty: 90 TABLET | Refills: 3 | Status: SHIPPED | OUTPATIENT
Start: 2022-03-15

## 2022-03-15 RX ORDER — ACETAMINOPHEN 500 MG
TABLET ORAL
Qty: 180 TABLET | Refills: 2 | Status: SHIPPED | OUTPATIENT
Start: 2022-03-15 | End: 2022-09-12

## 2022-03-15 SDOH — ECONOMIC STABILITY: FOOD INSECURITY: WITHIN THE PAST 12 MONTHS, THE FOOD YOU BOUGHT JUST DIDN'T LAST AND YOU DIDN'T HAVE MONEY TO GET MORE.: NEVER TRUE

## 2022-03-15 SDOH — ECONOMIC STABILITY: FOOD INSECURITY: WITHIN THE PAST 12 MONTHS, YOU WORRIED THAT YOUR FOOD WOULD RUN OUT BEFORE YOU GOT MONEY TO BUY MORE.: NEVER TRUE

## 2022-03-15 ASSESSMENT — PATIENT HEALTH QUESTIONNAIRE - PHQ9
1. LITTLE INTEREST OR PLEASURE IN DOING THINGS: 0
SUM OF ALL RESPONSES TO PHQ QUESTIONS 1-9: 0
SUM OF ALL RESPONSES TO PHQ QUESTIONS 1-9: 0
2. FEELING DOWN, DEPRESSED OR HOPELESS: 0
SUM OF ALL RESPONSES TO PHQ QUESTIONS 1-9: 0
SUM OF ALL RESPONSES TO PHQ QUESTIONS 1-9: 0
SUM OF ALL RESPONSES TO PHQ9 QUESTIONS 1 & 2: 0

## 2022-03-15 ASSESSMENT — SOCIAL DETERMINANTS OF HEALTH (SDOH): HOW HARD IS IT FOR YOU TO PAY FOR THE VERY BASICS LIKE FOOD, HOUSING, MEDICAL CARE, AND HEATING?: NOT HARD AT ALL

## 2022-03-15 NOTE — PATIENT INSTRUCTIONS
Personalized Preventive Plan for Reny Kirby - 3/15/2022  Medicare offers a range of preventive health benefits. Some of the tests and screenings are paid in full while other may be subject to a deductible, co-insurance, and/or copay. Some of these benefits include a comprehensive review of your medical history including lifestyle, illnesses that may run in your family, and various assessments and screenings as appropriate. After reviewing your medical record and screening and assessments performed today your provider may have ordered immunizations, labs, imaging, and/or referrals for you. A list of these orders (if applicable) as well as your Preventive Care list are included within your After Visit Summary for your review. Other Preventive Recommendations:    · A preventive eye exam performed by an eye specialist is recommended every 1-2 years to screen for glaucoma; cataracts, macular degeneration, and other eye disorders. · A preventive dental visit is recommended every 6 months. · Try to get at least 150 minutes of exercise per week or 10,000 steps per day on a pedometer . · Order or download the FREE \"Exercise & Physical Activity: Your Everyday Guide\" from [employer]. Call 3-118.305.4429 or search [employer] online. · You need 1058-7568 mg of calcium and 8382-6127 IU of vitamin D per day. It is possible to meet your calcium requirement with diet alone, but a vitamin D supplement is usually necessary to meet this goal.  · When exposed to the sun, use a sunscreen that protects against both UVA and UVB radiation with an SPF of 30 or greater. Reapply every 2 to 3 hours or after sweating, drying off with a towel, or swimming. · Always wear a seat belt when traveling in a car. Always wear a helmet when riding a bicycle or motorcycle.

## 2022-03-15 NOTE — PROGRESS NOTES
Medicare Annual Wellness Visit    Clare Suero is here for Medicare AWV    Assessment & Plan   Chronic obstructive pulmonary disease, unspecified COPD type ([facility] [address]  Assessment & Plan:   Monitored by specialist- no acute findings meriting change in the plan    Chronic diastolic (congestive) heart failure ([facility] [address]  Assessment & Plan:   Monitored by specialist- no acute findings meriting change in the plan    Major depressive disorder with single episode, in full remission ([facility] [address]  Hypothyroidism, unspecified type  -     TSH; Future  -     T4, Free; Future  Polypharmacy  -     Hepatic Function Panel; Future      Recommendations for Preventive Services Due: see orders and patient instructions/AVS.  Recommended screening schedule for the next 5-10 years is provided to the patient in written form: see Patient Instructions/AVS.     Return in about 1 month (around 4/15/2022) for Diabetes. Subjective   The following acute and/or chronic problems were also addressed today:  OA    Patient's complete Health Risk Assessment and screening values have been reviewed and are found in Flowsheets. The following problems were reviewed today and where indicated follow up appointments were made and/or referrals ordered. Positive Risk Factor Screenings with Interventions:               Opioid Risk: (Low risk score <55) Opioid risk score: 7    Patient is low risk for opioid use disorder or overdose.   Last PDMP [facility] as Reviewed:  Review User Review Instant Review Result             General Health and ACP:  General  In general, how would you say your health is?: Very Good  In the past 7 days, have you experienced any of the following: New or Increased Pain, New or Increased Fatigue, Loneliness, Social Isolation, Stress or Anger?: (!) Yes  Select all that apply: (!) New or Increased Pain  Do you get the social and emotional support that you need?: Yes  Do you have a Living Will?: Yes    Advance Directives     Power of [employer] Will ACP-Advance Directive ACP-Power of     Not on File Not on File Not on File Not on File      General Health Risk Interventions:  · Pain issues: OA in several areas, physical therapy referral ordered He wants to exercise on own at home rather than try PT     Health Habits/Nutrition:     Physical Activity: Sufficiently Active    Days of Exercise per Week: 7 days    Minutes of Exercise per Session: 30 min     Have you lost any weight without trying in the past 3 months?: No  Body mass index: (!) 36.09  Have you seen the dentist within the past year?: (!) No    Health Habits/Nutrition Interventions:  · Inadequate physical activity:  patient agrees to increase physical activity as follows: son fiona will get him walking again, educational materials provided to promote increased physical activity  · Dental exam overdue:  patient encouraged to make appointment with his/her dentist      ADLs:  In the past 7 days, did you need help from others to perform any of the following everyday activities: Eating, dressing, grooming, bathing, toileting, or walking/balance?: No  In the past 7 days, did you need help from others to take care of any of the following: Laundry, housekeeping, banking/finances, shopping, telephone use, food preparation, transportation, or taking medications?: (!) Yes  Select all that apply: (!) Laundry    ADL Interventions:  · he does everything but laundry          Objective   Vitals:    03/15/22 1408   BP: 138/78   Pulse: 70   SpO2: 98%   Weight: 237 lb 6.4 oz (107.7 kg)   Height: 5' 8\" (1.727 m)      Body mass index is 36.1 kg/m². Allergies   Allergen Reactions    Amiodarone Other (See Comments)     hallucinations     Prior to Visit Medications    Medication Sig Taking?  Authorizing Provider   Continuous Blood Gluc Sensor (FREESTYLE MARSHAL 14 DAY SENSOR) MISC APPLY ONE SENSOR TO THE UPPER ARM ONCE EVERY 14 DAYS Yes Ruth Reveles, PA-C   ACETAMINOPHEN EXTRA STRENGTH 500 MG tablet TAKE 2 TABLETS BY MOUTH THREE TIMES A DAY Yes Isaías López PA-C   budesonide (PULMICORT) 0.5 MG/2ML nebulizer suspension INHALE 1 VIAL (2 MLS) VIA NEBULIZER TWO TIMES A DAY Yes Kade Morgan MD   mupirocin (BACTROBAN) 2 % ointment Apply topically 3 times daily. Yes Isaías López PA-C   aspirin (ASPIRIN CHILDRENS) 81 MG chewable tablet Take 1 tablet by mouth daily Yes Adalberto Murillo MD   albuterol (PROVENTIL) (2.5 MG/3ML) 0.083% nebulizer solution INHALE THE CONTENTS OF 1 VIAL EVERY 8 HOURS AS NEEDED Yes Historical Provider, MD   metFORMIN (GLUCOPHAGE) 500 MG tablet Take 1 tablet by mouth daily (with breakfast) Yes Isaías López PA-C   levothyroxine (SYNTHROID) 137 MCG tablet TAKE ONE TABLET BY MOUTH ONE TIME DAILY Yes Isaías López PA-C   tamsulosin (FLOMAX) 0.4 MG capsule TAKE 1 CAPSULE EVERY EVENING Yes Isaías López PA-C   sertraline (ZOLOFT) 100 MG tablet TAKE ONE TABLET BY MOUTH ONE TIME DAILY Yes Isaías López PA-C   Continuous Blood Gluc  (FREESTYLE MARSHAL 14 DAY READER) SAVANNAH Testing BID. Yes Isaías López PA-C   Alcohol Swabs PADS For any brand insurance will cover. TEST BID Yes Isaías López PA-C   Blood Glucose Monitoring Suppl MISC For any brand insurance will cover. GLUCOMETER. TEST BID. Yes Isaías López PA-C   blood glucose test strips (ASCENSIA AUTODISC VI;ONE TOUCH ULTRA TEST VI) strip For any brand insurance will cover.  TEST BID Yes Isaías López PA-C   carvedilol (COREG) 6.25 MG tablet Take 1 tablet by mouth 2 times daily  Yes Historical Provider, MD   Omega-3 Fatty Acids (FISH OIL OMEGA-3 PO) Take by mouth Yes Historical Provider, MD   clopidogrel (PLAVIX) 75 MG tablet Take 1 tablet by mouth daily Yes Isaías López PA-C   losartan (COZAAR) 100 MG tablet Take 1 tablet by mouth 2 times daily Yes Isaías López PA-C   Multiple Vitamins-Minerals (ONE DAILY MENS) TABS Take 1 tablet by mouth daily Yes Historical Provider, MD Shultz (Including outside providers/suppliers regularly involved in providing care):   Patient Care Team:  Asya Childs PA-C as PCP - General  Asya Childs PA-C as PCP - [facility] Empaneled Provider    Reviewed and updated this visit:  Tobacco  Allergies  Meds  Problems  Med Hx  Surg Hx  Soc Hx  Fam Hx                  Obesity Counseling: Assessed behavioral health risks and factors affecting choice of behavior. Suggested weight control approaches, including dietary changes behavioral modification and follow up plan. Provided educational and support documentation.   Time spent (minutes): 10

## 2022-05-03 ENCOUNTER — TELEPHONE (OUTPATIENT)
Dept: PRIMARY CARE CLINIC | Age: 82
End: 2022-05-03

## 2022-05-03 NOTE — TELEPHONE ENCOUNTER
Patients son-in-law called in - states that cardiologist, Dr. Ashly Contreras, wants patient to D/C the Acetaminophen - per son-in-law cardiologist is requesting that patient go back on the Fioricet with Codeine due to an interaction with cardiac medication. Patient uses [facility] on [address] for [employer]. Please advise.

## 2022-05-03 NOTE — TELEPHONE ENCOUNTER
I said the same thing while on the phone with son-in-law - he states it's the difference in the dosing. States they had already discontinued the acetaminophen.

## 2022-05-18 NOTE — TELEPHONE ENCOUNTER
Patient's son-in-law called back about this - I asked that he have the cardiologist send something over with this request as there was nothing in the note.

## 2022-05-24 DIAGNOSIS — R51.9 CHRONIC NONINTRACTABLE HEADACHE, UNSPECIFIED HEADACHE TYPE: ICD-10-CM

## 2022-05-24 DIAGNOSIS — G89.29 CHRONIC NONINTRACTABLE HEADACHE, UNSPECIFIED HEADACHE TYPE: ICD-10-CM

## 2022-05-25 NOTE — TELEPHONE ENCOUNTER
Please call son in law and ask if the Tylenol was helping the HAs. Please call Dr. Sanon Bodily office and ask his nurse if Dr. Fabiola Randall told him to not use Tylenol for HAs and instead use the Fioricet for some reason.

## 2022-05-27 ENCOUNTER — TELEPHONE (OUTPATIENT)
Dept: PRIMARY CARE CLINIC | Age: 82
End: 2022-05-27

## 2022-05-30 DIAGNOSIS — N13.8 BPH WITH OBSTRUCTION/LOWER URINARY TRACT SYMPTOMS: ICD-10-CM

## 2022-05-30 DIAGNOSIS — N40.1 BPH WITH OBSTRUCTION/LOWER URINARY TRACT SYMPTOMS: ICD-10-CM

## 2022-06-01 RX ORDER — TAMSULOSIN HYDROCHLORIDE 0.4 MG/1
CAPSULE ORAL
Qty: 90 CAPSULE | Refills: 0 | Status: SHIPPED | OUTPATIENT
Start: 2022-06-01 | End: 2022-06-06

## 2022-06-02 RX ORDER — BUTALBITAL, ACETAMINOPHEN, CAFFEINE AND CODEINE PHOSPHATE 50; 325; 40; 30 MG/1; MG/1; MG/1; MG/1
CAPSULE ORAL
Qty: 60 CAPSULE | Refills: 0 | OUTPATIENT
Start: 2022-06-02 | End: 2022-06-24

## 2022-06-02 NOTE — TELEPHONE ENCOUNTER
I reviewed message sent from Dr. Bello Records office and think there is a misunderstanding. Baby ASA was not stopped. Fioricet was stopped. Tylenol is fine to use as long as it is not used with Fioricet which has Tylenol in it.  Sent a message back to Dr. Bello Records and to patient's family

## 2022-06-02 NOTE — TELEPHONE ENCOUNTER
Please draft the letter I placed on your desk and send to Dr. Candice Mora office and to [facility] RANDAL LESLIE son in law.

## 2022-06-05 DIAGNOSIS — N13.8 BPH WITH OBSTRUCTION/LOWER URINARY TRACT SYMPTOMS: ICD-10-CM

## 2022-06-05 DIAGNOSIS — E11.9 TYPE 2 DIABETES MELLITUS WITHOUT COMPLICATION, WITHOUT LONG-TERM CURRENT USE OF INSULIN (HCC): ICD-10-CM

## 2022-06-05 DIAGNOSIS — N40.1 BPH WITH OBSTRUCTION/LOWER URINARY TRACT SYMPTOMS: ICD-10-CM

## 2022-06-06 DIAGNOSIS — G89.29 CHRONIC NONINTRACTABLE HEADACHE, UNSPECIFIED HEADACHE TYPE: ICD-10-CM

## 2022-06-06 DIAGNOSIS — R51.9 CHRONIC NONINTRACTABLE HEADACHE, UNSPECIFIED HEADACHE TYPE: ICD-10-CM

## 2022-06-06 RX ORDER — TAMSULOSIN HYDROCHLORIDE 0.4 MG/1
CAPSULE ORAL
Qty: 90 CAPSULE | Refills: 0 | Status: SHIPPED | OUTPATIENT
Start: 2022-06-06

## 2022-06-06 RX ORDER — FLASH GLUCOSE SENSOR
KIT MISCELLANEOUS
Qty: 6 EACH | Refills: 0 | Status: SHIPPED | OUTPATIENT
Start: 2022-06-06 | End: 2022-07-21 | Stop reason: SDUPTHER

## 2022-06-06 RX ORDER — BUTALBITAL, ACETAMINOPHEN, CAFFEINE AND CODEINE PHOSPHATE 50; 325; 40; 30 MG/1; MG/1; MG/1; MG/1
CAPSULE ORAL
Qty: 60 CAPSULE | Refills: 0 | OUTPATIENT
Start: 2022-06-06 | End: 2022-09-04

## 2022-06-08 RX ORDER — LEVOTHYROXINE SODIUM 137 UG/1
TABLET ORAL
Qty: 90 TABLET | Refills: 3 | Status: SHIPPED | OUTPATIENT
Start: 2022-06-08 | End: 2022-10-25

## 2022-06-08 RX ORDER — BUDESONIDE 0.5 MG/2ML
INHALANT ORAL
Qty: 120 ML | Refills: 5 | Status: SHIPPED | OUTPATIENT
Start: 2022-06-08

## 2022-06-08 NOTE — TELEPHONE ENCOUNTER
LAST VISIT: 12/2/21  NEXT VISIT: 9/27/22    No mention of this medication in your last dictation but you have prescribed in the past. Please sign for refill if ok. Thank you.

## 2022-06-16 ENCOUNTER — TELEPHONE (OUTPATIENT)
Dept: PRIMARY CARE CLINIC | Age: 82
End: 2022-06-16

## 2022-06-16 NOTE — TELEPHONE ENCOUNTER
Alcus Session called requesting the patient start receiving the Fioricet again - she states patient is confused and agreed to the acetaminophen only by mistake. Asking again to reorder Fioricet. Patient is also scheduled for an appointment in July regarding this.

## 2022-07-21 ENCOUNTER — OFFICE VISIT (OUTPATIENT)
Dept: PRIMARY CARE CLINIC | Age: 82
End: 2022-07-21
Payer: MEDICARE

## 2022-07-21 ENCOUNTER — IMMUNIZATION (OUTPATIENT)
Dept: PRIMARY CARE CLINIC | Age: 82
End: 2022-07-21
Payer: MEDICARE

## 2022-07-21 VITALS
OXYGEN SATURATION: 94 % | WEIGHT: 219 LBS | HEIGHT: 68 IN | SYSTOLIC BLOOD PRESSURE: 134 MMHG | BODY MASS INDEX: 33.19 KG/M2 | DIASTOLIC BLOOD PRESSURE: 80 MMHG | HEART RATE: 74 BPM

## 2022-07-21 VITALS
HEIGHT: 68 IN | WEIGHT: 219 LBS | BODY MASS INDEX: 33.19 KG/M2 | SYSTOLIC BLOOD PRESSURE: 134 MMHG | DIASTOLIC BLOOD PRESSURE: 80 MMHG

## 2022-07-21 DIAGNOSIS — E03.9 HYPOTHYROIDISM, UNSPECIFIED TYPE: ICD-10-CM

## 2022-07-21 DIAGNOSIS — I10 BENIGN ESSENTIAL HYPERTENSION: Primary | ICD-10-CM

## 2022-07-21 DIAGNOSIS — Z23 NEED FOR VACCINATION: Primary | ICD-10-CM

## 2022-07-21 DIAGNOSIS — Z23 NEED FOR VACCINATION: ICD-10-CM

## 2022-07-21 DIAGNOSIS — E78.2 MIXED HYPERLIPIDEMIA: ICD-10-CM

## 2022-07-21 DIAGNOSIS — E11.9 TYPE 2 DIABETES MELLITUS WITHOUT COMPLICATION, WITHOUT LONG-TERM CURRENT USE OF INSULIN (HCC): Primary | ICD-10-CM

## 2022-07-21 DIAGNOSIS — I25.10 CORONARY ARTERY DISEASE INVOLVING NATIVE CORONARY ARTERY OF NATIVE HEART WITHOUT ANGINA PECTORIS: ICD-10-CM

## 2022-07-21 DIAGNOSIS — E11.9 TYPE 2 DIABETES MELLITUS WITHOUT COMPLICATION, WITHOUT LONG-TERM CURRENT USE OF INSULIN (HCC): ICD-10-CM

## 2022-07-21 LAB — HBA1C MFR BLD: 5.9 %

## 2022-07-21 PROCEDURE — G8417 CALC BMI ABV UP PARAM F/U: HCPCS | Performed by: PHYSICIAN ASSISTANT

## 2022-07-21 PROCEDURE — 99214 OFFICE O/P EST MOD 30 MIN: CPT | Performed by: PHYSICIAN ASSISTANT

## 2022-07-21 PROCEDURE — 83036 HEMOGLOBIN GLYCOSYLATED A1C: CPT | Performed by: PHYSICIAN ASSISTANT

## 2022-07-21 PROCEDURE — 0054A COVID-19, PFIZER GRAY TOP, DO NOT DILUTE, (AGE 12 Y+), IM, 30MCG/0.3 ML: CPT | Performed by: PHYSICIAN ASSISTANT

## 2022-07-21 PROCEDURE — 1123F ACP DISCUSS/DSCN MKR DOCD: CPT | Performed by: PHYSICIAN ASSISTANT

## 2022-07-21 PROCEDURE — 1036F TOBACCO NON-USER: CPT | Performed by: PHYSICIAN ASSISTANT

## 2022-07-21 PROCEDURE — 91305 COVID-19, PFIZER GRAY TOP, DO NOT DILUTE, (AGE 12 Y+), IM, 30MCG/0.3 ML: CPT | Performed by: PHYSICIAN ASSISTANT

## 2022-07-21 PROCEDURE — G8427 DOCREV CUR MEDS BY ELIG CLIN: HCPCS | Performed by: PHYSICIAN ASSISTANT

## 2022-07-21 RX ORDER — FLASH GLUCOSE SENSOR
KIT MISCELLANEOUS
Qty: 6 EACH | Refills: 3 | Status: SHIPPED | OUTPATIENT
Start: 2022-07-21 | End: 2022-09-02

## 2022-07-21 RX ORDER — ATORVASTATIN CALCIUM 40 MG/1
TABLET, FILM COATED ORAL
COMMUNITY
Start: 2022-07-17 | End: 2022-07-21

## 2022-07-21 RX ORDER — FLASH GLUCOSE SCANNING READER
EACH MISCELLANEOUS
Qty: 1 EACH | Refills: 0 | Status: CANCELLED | OUTPATIENT
Start: 2022-07-21

## 2022-07-21 RX ORDER — FLASH GLUCOSE SENSOR
KIT MISCELLANEOUS
Qty: 2 EACH | Refills: 5 | Status: CANCELLED | OUTPATIENT
Start: 2022-07-21

## 2022-07-21 RX ORDER — FLASH GLUCOSE SCANNING READER
EACH MISCELLANEOUS
Qty: 1 EACH | Refills: 1 | Status: SHIPPED | OUTPATIENT
Start: 2022-07-21

## 2022-07-21 RX ORDER — AMLODIPINE BESYLATE 5 MG/1
5 TABLET ORAL DAILY
COMMUNITY

## 2022-07-21 RX ORDER — ATORVASTATIN CALCIUM 40 MG/1
TABLET, FILM COATED ORAL
Qty: 90 TABLET | Refills: 0
Start: 2022-07-21

## 2022-07-21 ASSESSMENT — ENCOUNTER SYMPTOMS
NAUSEA: 0
SHORTNESS OF BREATH: 0
DIARRHEA: 0
SORE THROAT: 0
COUGH: 0
COLOR CHANGE: 0
ABDOMINAL PAIN: 0
SINUS PRESSURE: 0
VOMITING: 0
CONSTIPATION: 0
RHINORRHEA: 0
CHEST TIGHTNESS: 0

## 2022-07-21 NOTE — PROGRESS NOTES
[facility]  [address] [facility] [address]  Dept: [facility] Horace Cerda is a 81 y.o. male who presents today for his medical conditions/complaints as noted below. Chief Complaint   Patient presents with    Diabetes     Patient does not check sugars. Migraine     Discuss medications       HPI:     Diabetes  He presents for his follow-up diabetic visit. He has type 2 diabetes mellitus. His disease course has been stable. Hypoglycemia symptoms include headaches. Pertinent negatives for hypoglycemia include no dizziness or seizures. Pertinent negatives for diabetes include no chest pain, no fatigue, no polydipsia, no polyphagia, no polyuria and no weakness. Symptoms are stable. Current diabetic treatment includes oral agent (monotherapy). He is compliant with treatment most of the time. Home blood sugar record trend: not chekcing. An ACE inhibitor/angiotensin II receptor blocker is being taken (on statin). He does not see a podiatrist.Eye exam is not current. Migraine   Pertinent negatives include no abdominal pain, coughing, dizziness, fever, nausea, numbness, rhinorrhea, seizures, sinus pressure, sore throat, vomiting or weakness. Reviewed Dec pulmo note  's eval? Did not have but does not drive.    LDL Cholesterol (mg/dL)   Date Value   12/16/2020 120   02/01/2016 160 (H)   01/08/2013 129 (H)     LDL Calculated (mg/dL)   Date Value   07/09/2021 122   11/28/2017 122   11/05/2016 193 (A)       (goal LDL is <100)   AST (U/L)   Date Value   03/15/2022 25     ALT (U/L)   Date Value   03/15/2022 27     BUN (mg/dL)   Date Value   08/19/2021 15     BP Readings from Last 3 Encounters:   07/21/22 134/80   03/15/22 138/78   12/02/21 128/86          (goal 120/80)    Past Medical History:   Diagnosis Date    Depression     Hyperlipidemia     Hypertension     Hypothyroidism       Past Surgical History:   Procedure Laterality Date    CARDIAC Continuous Blood Gluc Sensor (FREESTYLE MARSHAL 14 DAY SENSOR) [facility] [place] ONE SENSOR TO THE UPPER ARM ONCE EVERY 14 DAYS (Patient not taking: Reported on 7/21/2022) 6 each 0    mupirocin (BACTROBAN) 2 % ointment Apply topically 3 times daily. (Patient not taking: Reported on 7/21/2022) 22 g 1    Continuous Blood Gluc  (FREESTYLE MARSHAL 14 DAY READER) [facility] Testing BID. (Patient not taking: Reported on 7/21/2022) 1 Device 0    Alcohol Swabs PADS For any brand insurance will cover. TEST BID (Patient not taking: Reported on 7/21/2022) 100 each 11    Blood Glucose Monitoring Suppl MISC For any brand insurance will cover. GLUCOMETER. TEST BID. (Patient not taking: Reported on 7/21/2022) 1 each 0    blood glucose test strips (ASCENSIA AUTODISC VI;ONE TOUCH ULTRA TEST VI) strip For any brand insurance will cover. TEST BID (Patient not taking: Reported on 7/21/2022) 100 strip 11     No current facility-administered medications for this visit. Allergies   Allergen Reactions    Amiodarone Other (See Comments)     hallucinations       Health Maintenance   Topic Date Due    DTaP/Tdap/Td vaccine (1 - Tdap) Never done    COVID-19 Vaccine (4 - Booster for Pfizer series) 04/07/2022    Lipids  07/09/2022    Flu vaccine (1) 09/01/2022    Depression Monitoring  03/15/2023    Annual Wellness Visit (AWV)  03/16/2023    Shingles vaccine  Completed    Pneumococcal 65+ years Vaccine  Completed    Hepatitis A vaccine  Aged Out    Hib vaccine  Aged Out    Meningococcal (ACWY) vaccine  Aged Out       Subjective:      Review of Systems   Constitutional:  Negative for appetite change, chills, fatigue and fever. HENT:  Negative for dental problem, rhinorrhea, sinus pressure, sneezing and sore throat. Respiratory:  Negative for cough, chest tightness and shortness of breath. Cardiovascular:  Negative for chest pain, palpitations and leg swelling.    Gastrointestinal:  Negative for abdominal pain, constipation, diarrhea, nausea and vomiting. Endocrine: Negative for polydipsia, polyphagia and polyuria. Genitourinary:  Negative for dysuria, frequency, hematuria and urgency. Skin:  Negative for color change and rash. Neurological:  Positive for headaches. Negative for dizziness, seizures, syncope, weakness, light-headedness and numbness. Hematological:  Negative for adenopathy. Objective:     /80   Pulse 74   Ht 5' 8\" (1.727 m)   Wt 219 lb (99.3 kg)   SpO2 94%   BMI 33.30 kg/m²   Physical Exam  Vitals reviewed. Neck:      Thyroid: No thyromegaly. Cardiovascular:      Rate and Rhythm: Normal rate and regular rhythm. Pulses:           Dorsalis pedis pulses are 1+ on the right side and 1+ on the left side. Heart sounds: Normal heart sounds. Pulmonary:      Effort: Pulmonary effort is normal.      Breath sounds: Normal breath sounds. Feet:      Right foot:      Protective Sensation: 5 sites tested. 5 sites sensed. Skin integrity: Skin integrity normal.      Toenail Condition: Right toenails are long. Left foot:      Protective Sensation: 5 sites tested. 5 sites sensed. Skin integrity: Skin integrity normal.      Toenail Condition: Left toenails are long. Neurological:      Mental Status: He is alert and oriented to person, place, and time. Assessment:       Diagnosis Orders   1. Benign essential hypertension        2. Type 2 diabetes mellitus without complication, without long-term current use of insulin (HCC)  POCT microalbumin    POCT glycosylated hemoglobin (Hb A1C)     DIABETES FOOT EXAM      3. Coronary artery disease involving native coronary artery of native heart without angina pectoris  Lipid Panel    ALT    AST      4. Hypothyroidism, unspecified type  TSH    T4, Free      5. Mixed hyperlipidemia  Lipid Panel    ALT    AST    atorvastatin (LIPITOR) 40 MG tablet      6.  Need for vaccination             Plan:    Continue Metformin  Urine micral  Reviewed Foot care  Covid Booster today   BW ordered  Have diabetic eye exam   Free style Trey scripts sent to [address] [facility] on [address]   Had discussion on Fioricet and patient says he is doing very well off of this medicine with HAs. So using just Tylenol 500mg 1 or 2 tablets, not more than 6 in a day. Return in about 3 months (around 10/21/2022) for recheck--1/2 hour. Orders Placed This Encounter   Procedures    Lipid Panel     Standing Status:   Future     Standing Expiration Date:   7/21/2023     Order Specific Question:   Is Patient Fasting?/# of Hours     Answer:   10-12 hours    TSH     Standing Status:   Future     Standing Expiration Date:   7/21/2023    T4, Free     Standing Status:   Future     Standing Expiration Date:   7/21/2023    ALT     Standing Status:   Future     Standing Expiration Date:   7/21/2023    AST     Standing Status:   Future     Standing Expiration Date:   7/21/2023    POCT microalbumin    POCT glycosylated hemoglobin (Hb A1C)    HM DIABETES FOOT EXAM       Orders Placed This Encounter   Medications    atorvastatin (LIPITOR) 40 MG tablet     Sig: TAKE 1 TABLET BY MOUTH NIGHTLY     Dispense:  90 tablet     Refill:  0         Patient given educational materials - see patient instructions. Discussed use, benefit, and side effects of prescribed medications. All patient questions answered. Pt voiced understanding. Reviewed health maintenance. Instructed to continue current medications, diet and exercise. Patient agreed with treatment plan. Follow up as directed.      Electronically signed by Miroslava Bean PA-C on 7/21/2022 at 2:24 PM

## 2022-07-21 NOTE — PROGRESS NOTES
After obtaining consent, and per orders of Andrei RODRIGUEZ-C, injection of [employer] given in right deltoid by Montez Duckworth MA. Patient instructed to remain in clinic for 15 minutes afterwards, patient tolerated vaccine well.

## 2022-07-21 NOTE — TELEPHONE ENCOUNTER
Pended Freestyle mariola 2 devise and sensors, pended for them to be printed so I can fax over to [facility] home medical equipment.

## 2022-09-01 DIAGNOSIS — E11.9 TYPE 2 DIABETES MELLITUS WITHOUT COMPLICATION, WITHOUT LONG-TERM CURRENT USE OF INSULIN (HCC): ICD-10-CM

## 2022-09-02 RX ORDER — FLASH GLUCOSE SENSOR
KIT MISCELLANEOUS
Qty: 6 EACH | Refills: 0 | Status: SHIPPED | OUTPATIENT
Start: 2022-09-02

## 2022-09-11 DIAGNOSIS — M25.50 ARTHRALGIA, UNSPECIFIED JOINT: ICD-10-CM

## 2022-09-12 RX ORDER — ACETAMINOPHEN 500 MG
TABLET ORAL
Qty: 180 TABLET | Refills: 2 | Status: SHIPPED | OUTPATIENT
Start: 2022-09-12

## 2022-10-19 ENCOUNTER — TELEPHONE (OUTPATIENT)
Dept: PULMONOLOGY | Age: 82
End: 2022-10-19

## 2022-10-24 LAB
ALT SERPL-CCNC: NORMAL U/L
AST SERPL-CCNC: NORMAL U/L
CHOLESTEROL, TOTAL: 211 MG/DL
CHOLESTEROL/HDL RATIO: 6.8
HDLC SERPL-MCNC: 31 MG/DL (ref 35–70)
LDL CHOLESTEROL CALCULATED: 128 MG/DL (ref 0–160)
NONHDLC SERPL-MCNC: ABNORMAL MG/DL
T4 FREE: NORMAL
TRIGL SERPL-MCNC: 260 MG/DL
TSH SERPL DL<=0.05 MIU/L-ACNC: NORMAL M[IU]/L
VLDLC SERPL CALC-MCNC: 52 MG/DL

## 2022-10-25 DIAGNOSIS — E05.80 IATROGENIC HYPERTHYROIDISM: ICD-10-CM

## 2022-10-25 DIAGNOSIS — I25.10 CORONARY ARTERY DISEASE INVOLVING NATIVE CORONARY ARTERY OF NATIVE HEART WITHOUT ANGINA PECTORIS: ICD-10-CM

## 2022-10-25 DIAGNOSIS — E03.9 HYPOTHYROIDISM, UNSPECIFIED TYPE: ICD-10-CM

## 2022-10-25 DIAGNOSIS — E78.2 MIXED HYPERLIPIDEMIA: ICD-10-CM

## 2022-10-25 DIAGNOSIS — E03.9 HYPOTHYROIDISM, UNSPECIFIED TYPE: Primary | ICD-10-CM

## 2022-10-25 RX ORDER — LEVOTHYROXINE SODIUM 0.12 MG/1
TABLET ORAL
Qty: 30 TABLET | Refills: 1 | Status: SHIPPED | OUTPATIENT
Start: 2022-10-25

## 2022-10-31 ENCOUNTER — TELEPHONE (OUTPATIENT)
Dept: OTHER | Age: 82
End: 2022-10-31

## 2022-10-31 NOTE — TELEPHONE ENCOUNTER
Called after hours to verify appointment. Writer accessed [address] scheduling and verified appointment. Caller will return office call to reschedule when office opens.   DEB ONTIVEROS.

## 2022-11-08 NOTE — PROGRESS NOTES
Subjective:      Patient ID: Elliott Haddad is a 81 y.o. male. HPI  Patient here for COPD, asthma, obesity, diastolic heart failure. Patient was last seen in the office in December 2021 per Dr. Solange Meyers    Patient has central sleep apnea. Patient states that his current machine is getting hot, and he needs new supplies. New order for DME supplies elevated today as well as in order to replace or repair machine. He is up-to-date with influenza and pneumococcal and COVID vaccines. He continues on Pulmicort and albuterol nebulizer treatments. Medications:   Pulmicort  albuterol        PRIOR WORKUP:  PFT:  PFT 12/2/2021: FEV1 of 1.24 L, FVC of 1.66 L. TLC 45.52% of predicted. Patient unable to complete body box. Diffusion capacity is 132% of predicted. Final impression: Extrapulmonary restrictive ventilatory defect secondary to body habitus. CT Imaging:  CT chest 6/12/2018: [facility]: No axillary adenopathy. Small amount of scarring midline. No focal lung infiltrate. No filling defect. No pneumothorax. No evidence for interstitial pulmonary fibrosis. No bronchiectasis or honeycombing. CT chest 5/15/2017: [facility]: No mediastinal or hilar adenopathy. No axillary adenopathy. Calcified right hilar lymph nodes. Small amount of scarring in each lung. Few small nodules in the right lung are noted on the 11/7/2016 exam have now intervally cleared. No new nodules. No filling defect within the airways. CT chest 11/7/2016: [facility]: No hilar or mediastinal adenopathy. Scattered tiny nodules on the right with grouping of small nodules posteriorly. May be infectious in nature 3-month follow-up is recommended. Sleep Study:  Sleep study 11/10/2017: Patient was initiated on CPAP ultimately transitioned over to BiPAP. Patient still had episodes of central apnea on BiPAP. With ST mode central apnea was improved however patient was having difficulty with mask leak. Patient was titrated with best settings at IPAP of 20 EPAP of 16 with a backup rate of 10. This produced the lowest AHI. Laboratory Evaluation:    Immunizations:   Immunization History   Administered Date(s) Administered    COVID-19, PFIZER GRAY top, DO NOT Dilute, (age 15 y+), IM, 30 mcg/0.3 mL 07/21/2022    COVID-19, PFIZER PURPLE top, DILUTE for use, (age 15 y+), 30mcg/0.3mL 01/29/2021, 02/26/2021, 12/07/2021    Influenza Virus Vaccine 10/29/2013, 10/31/2014, 09/30/2015    Influenza, FLUAD, (age 72 y+), Adjuvanted, 0.5mL 11/21/2022    Influenza, High Dose (Fluzone 65 yrs and older) 10/24/2016, 12/27/2017, 09/15/2021    Pneumococcal Conjugate 13-valent ([id number]) 10/24/2016    Pneumococcal Polysaccharide ([id number]) 05/10/2004, 10/02/2012, 10/23/2012    Tetanus Toxoid, absorbed 05/10/2004    Zoster Live (Zostavax) 11/01/2012, 11/27/2012    Zoster Recombinant (Shingrix) 06/30/2021, 03/15/2022        No flowsheet data found. /78 (Site: Right Upper Arm, Position: Sitting, Cuff Size: Medium Adult)   Pulse 72   Resp 16   Ht 5' 8\" (1.727 m)   Wt 231 lb (104.8 kg)   SpO2 95%   BMI 35.12 kg/m²     Past Medical History:   Diagnosis Date    Depression     Hyperlipidemia     Hypertension     Hypothyroidism      Past Surgical History:   Procedure Laterality Date    CARDIAC CATHETERIZATION  09/23/2021    CORONARY ANGIOPLASTY WITH STENT PLACEMENT  2013    EYE SURGERY      as a child     Family History   Problem Relation Age of Onset    Cancer Mother         breast    Diabetes Brother     Heart Disease Brother     High Blood Pressure Brother     Diabetes Maternal Uncle     Heart Disease Maternal Uncle     Cancer Other         throat       Social History     Socioeconomic History    Marital status:       Spouse name: Not on file    Number of children: Not on file    Years of education: Not on file    Highest education level: Not on file   Occupational History    Not on file   Tobacco Use    Smoking status: Never     Passive exposure: Yes    Smokeless tobacco: Never   Substance and Sexual Activity    Alcohol use: No    Drug use: No    Sexual activity: Not Currently   Other Topics Concern    Not on file   Social History Narrative    Not on file     Social Determinants of Health     Financial Resource Strain: Low Risk     Difficulty of Paying Living Expenses: Not hard at all   Food Insecurity: No Food Insecurity    Worried About Running Out of Food in the Last Year: Never true    Ran Out of Food in the Last Year: Never true   Transportation Needs: Not on file   Physical Activity: Sufficiently Active    Days of Exercise per Week: 7 days    Minutes of Exercise per Session: 30 min   Stress: Not on file   Social Connections: Not on file   Intimate Partner Violence: Not on file   Housing Stability: Not on file       Review of Systems   Constitutional: Negative. HENT: Negative. Eyes: Negative. Respiratory: Negative. Cardiovascular: Negative. Gastrointestinal: Negative. Endocrine: Negative. Genitourinary: Negative. Musculoskeletal:         Gait instability   Allergic/Immunologic: Negative. Neurological:         Chronic headaches   Hematological: Negative. Psychiatric/Behavioral: Negative.        Objective:       Physical Exam  General appearance - alert, well appearing, and in no distress, oriented to person, place, and time, and overweight  Mental status - alert, oriented to person, place, and time, normal mood, behavior, speech, dress, motor activity, and thought processes  Eyes - pupils equal and reactive, extraocular eye movements intact  Ears -not examined  Nose - normal and patent, no erythema, discharge or polyps  Mouth - mucous membranes moist, pharynx normal without lesions  Neck - supple, no significant adenopathy  Chest - clear to auscultation, no wheezes, rales or rhonchi, symmetric air entry  Heart -normal rate, regular rhythm, normal S1, S2, no murmurs, rubs, clicks or gallops  Abdomen - soft, nontender, nondistended, no masses or organomegaly  Neuro- alert, oriented, normal speech, no focal findings or movement disorder noted}  Extremities - peripheral pulses normal, no pedal edema, no clubbing or cyanosis  Skin - normal coloration and turgor, no rashes, no suspicious skin lesions noted     Wt Readings from Last 3 Encounters:   11/21/22 231 lb (104.8 kg)   11/14/22 227 lb (103 kg)   07/21/22 219 lb (99.3 kg)       Results for orders placed or performed in visit on 11/14/22   POCT glycosylated hemoglobin (Hb A1C)   Result Value Ref Range    Hemoglobin A1C 5.9 %       No results found. Assessment:      1. Obstructive sleep apnea syndrome    2. Mild persistent asthma without complication    3. Severe obesity (BMI 35.0-35.9 with comorbidity) ([facility] [address]    4. Chronic obstructive pulmonary disease, unspecified COPD type ([facility] [address]    5. Need for influenza vaccination          Plan:      Medications reviewed- continue as ordered. Educated and clarified the medication use. Refills sent to Rx if requested. Recommend flu vaccination in the fall annually. Given today  Patient is up-to-date with pneumococcal vaccinations from pulmonary perspective. Patient has received initial Covid vaccination Recommend Bivalent booster when eligible. Discussed strategies to protect against Covid 19. Maintain an active lifestyle. Patient's questions were answered to his satisfaction. Pulmonary function tests were reviewed   CT scan of the chest was reviewed  Bipap with malfunction per patient- order placed to repair or replace machine. New DME orders submitted. Compliance data not available for my review.  Per patient report they use the machine faithfully every night, and report refreshing and restorative sleep without residual daytime fatigue  We'll see the patient back in 4 months          Electronically signed by VANESSA Douglass CNP on 11/21/2022 at 10:14 AM

## 2022-11-14 ENCOUNTER — OFFICE VISIT (OUTPATIENT)
Dept: PRIMARY CARE CLINIC | Age: 82
End: 2022-11-14
Payer: MEDICARE

## 2022-11-14 VITALS
WEIGHT: 227 LBS | BODY MASS INDEX: 34.4 KG/M2 | HEIGHT: 68 IN | OXYGEN SATURATION: 96 % | HEART RATE: 77 BPM | DIASTOLIC BLOOD PRESSURE: 80 MMHG | SYSTOLIC BLOOD PRESSURE: 132 MMHG

## 2022-11-14 DIAGNOSIS — E78.2 MIXED HYPERLIPIDEMIA: ICD-10-CM

## 2022-11-14 DIAGNOSIS — E11.9 TYPE 2 DIABETES MELLITUS WITHOUT COMPLICATION, WITHOUT LONG-TERM CURRENT USE OF INSULIN (HCC): ICD-10-CM

## 2022-11-14 DIAGNOSIS — R29.898 LEG WEAKNESS, BILATERAL: ICD-10-CM

## 2022-11-14 DIAGNOSIS — Z91.81 HISTORY OF RECENT FALL: ICD-10-CM

## 2022-11-14 DIAGNOSIS — E11.9 TYPE 2 DIABETES MELLITUS WITHOUT COMPLICATION, WITHOUT LONG-TERM CURRENT USE OF INSULIN (HCC): Primary | ICD-10-CM

## 2022-11-14 DIAGNOSIS — R26.89 BALANCE DISORDER: ICD-10-CM

## 2022-11-14 LAB — HBA1C MFR BLD: 5.9 %

## 2022-11-14 PROCEDURE — 1036F TOBACCO NON-USER: CPT | Performed by: PHYSICIAN ASSISTANT

## 2022-11-14 PROCEDURE — G8484 FLU IMMUNIZE NO ADMIN: HCPCS | Performed by: PHYSICIAN ASSISTANT

## 2022-11-14 PROCEDURE — 3044F HG A1C LEVEL LT 7.0%: CPT | Performed by: PHYSICIAN ASSISTANT

## 2022-11-14 PROCEDURE — 1123F ACP DISCUSS/DSCN MKR DOCD: CPT | Performed by: PHYSICIAN ASSISTANT

## 2022-11-14 PROCEDURE — 99214 OFFICE O/P EST MOD 30 MIN: CPT | Performed by: PHYSICIAN ASSISTANT

## 2022-11-14 PROCEDURE — G8417 CALC BMI ABV UP PARAM F/U: HCPCS | Performed by: PHYSICIAN ASSISTANT

## 2022-11-14 PROCEDURE — 83036 HEMOGLOBIN GLYCOSYLATED A1C: CPT | Performed by: PHYSICIAN ASSISTANT

## 2022-11-14 PROCEDURE — 3074F SYST BP LT 130 MM HG: CPT | Performed by: PHYSICIAN ASSISTANT

## 2022-11-14 PROCEDURE — G8427 DOCREV CUR MEDS BY ELIG CLIN: HCPCS | Performed by: PHYSICIAN ASSISTANT

## 2022-11-14 PROCEDURE — 3078F DIAST BP <80 MM HG: CPT | Performed by: PHYSICIAN ASSISTANT

## 2022-11-14 RX ORDER — AMLODIPINE BESYLATE 10 MG/1
10 TABLET ORAL DAILY
Qty: 90 TABLET | Refills: 1 | Status: SHIPPED
Start: 2022-11-14

## 2022-11-14 RX ORDER — FLASH GLUCOSE SENSOR
KIT MISCELLANEOUS
Qty: 6 EACH | Refills: 3 | Status: SHIPPED | OUTPATIENT
Start: 2022-11-14 | End: 2022-11-14 | Stop reason: SDUPTHER

## 2022-11-14 RX ORDER — PAROXETINE HYDROCHLORIDE 20 MG/1
TABLET, FILM COATED ORAL
COMMUNITY
Start: 2022-09-19

## 2022-11-14 RX ORDER — ATORVASTATIN CALCIUM 40 MG/1
TABLET, FILM COATED ORAL
Qty: 90 TABLET | Refills: 3 | Status: SHIPPED | OUTPATIENT
Start: 2022-11-14

## 2022-11-14 RX ORDER — FLASH GLUCOSE SENSOR
KIT MISCELLANEOUS
Qty: 6 EACH | Refills: 3 | Status: SHIPPED | OUTPATIENT
Start: 2022-11-14

## 2022-11-14 RX ORDER — FLASH GLUCOSE SCANNING READER
EACH MISCELLANEOUS
Qty: 1 EACH | Refills: 1 | Status: SHIPPED | OUTPATIENT
Start: 2022-11-14

## 2022-11-14 RX ORDER — AMLODIPINE BESYLATE 10 MG/1
TABLET ORAL
COMMUNITY
Start: 2022-09-19 | End: 2022-11-14

## 2022-11-14 RX ORDER — FLASH GLUCOSE SCANNING READER
EACH MISCELLANEOUS
Qty: 1 EACH | Refills: 1 | Status: SHIPPED | OUTPATIENT
Start: 2022-11-14 | End: 2022-11-14 | Stop reason: SDUPTHER

## 2022-11-14 ASSESSMENT — ENCOUNTER SYMPTOMS
COLOR CHANGE: 0
RHINORRHEA: 0
CONSTIPATION: 0
COUGH: 0
CHEST TIGHTNESS: 0
SHORTNESS OF BREATH: 0
NAUSEA: 0
SORE THROAT: 0
ABDOMINAL PAIN: 0
SINUS PRESSURE: 0
VOMITING: 0
DIARRHEA: 0

## 2022-11-14 NOTE — PROGRESS NOTES
Disease Brother     High Blood Pressure Brother     Diabetes Maternal Uncle     Heart Disease Maternal Uncle     Cancer Other         throat       Social History     Tobacco Use    Smoking status: Never     Passive exposure: Yes    Smokeless tobacco: Never   Substance Use Topics    Alcohol use: No      Current Outpatient Medications   Medication Sig Dispense Refill    PARoxetine (PAXIL) 20 MG tablet TAKE 1 TABLET BY MOUTH EVERY DAY      amLODIPine (NORVASC) 10 MG tablet Take 1 tablet by mouth daily 90 tablet 1    Continuous Blood Gluc  (FREESTYLE MARSHAL 14 DAY READER) SAVANNAH Testing BID. Testing BID. 1 each 1    Continuous Blood Gluc Sensor (FREESTYLE MARSHAL 14 DAY SENSOR) MISC Change sensor every 14 days 6 each 3    atorvastatin (LIPITOR) 40 MG tablet TAKE 1 TABLET BY MOUTH NIGHTLY 90 tablet 3    levothyroxine (SYNTHROID) 125 MCG tablet TAKE 1 TABLET BY MOUTH EVERY DAY 30 tablet 1    acetaminophen (ACETAMINOPHEN EXTRA STRENGTH) 500 MG tablet TAKE 2 TABLETS BY MOUTH THREE TIMES A  tablet 2    budesonide (PULMICORT) 0.5 MG/2ML nebulizer suspension INHALE 1 VIAL VIA NEBULIZER TWO TIMES A  mL 5    tamsulosin (FLOMAX) 0.4 mg capsule TAKE 1 CAPSULE BY MOUTH IN THE EVENING 90 capsule 0    metFORMIN (GLUCOPHAGE) 500 MG tablet TAKE 1 TABLET BY MOUTH EVERY DAY WITH BREAKFAST 180 tablet 0    aspirin (ASPIRIN CHILDRENS) 81 MG chewable tablet Take 1 tablet by mouth daily 90 tablet 3    albuterol (PROVENTIL) (2.5 MG/3ML) 0.083% nebulizer solution INHALE THE CONTENTS OF 1 VIAL EVERY 8 HOURS AS NEEDED      sertraline (ZOLOFT) 100 MG tablet TAKE ONE TABLET BY MOUTH ONE TIME DAILY 90 tablet 3    carvedilol (COREG) 6.25 MG tablet Take 1 tablet by mouth 2 times daily   3    Omega-3 Fatty Acids (FISH OIL OMEGA-3 PO) Take by mouth      clopidogrel (PLAVIX) 75 MG tablet Take 1 tablet by mouth daily 90 tablet 1    losartan (COZAAR) 100 MG tablet Take 1 tablet by mouth 2 times daily 180 tablet 1    Multiple Vitamins-Minerals (ONE DAILY MENS) TABS Take 1 tablet by mouth daily       No current facility-administered medications for this visit. Allergies   Allergen Reactions    Amiodarone Other (See Comments)     hallucinations       Health Maintenance   Topic Date Due    DTaP/Tdap/Td vaccine (1 - Tdap) Never done    Flu vaccine (1) 11/14/2023 (Originally 8/1/2022)    COVID-19 Vaccine (5 - Booster for Ohara Peter series) 11/14/2023 (Originally 9/15/2022)    Depression Monitoring  03/15/2023    Annual Wellness Visit (AWV)  03/16/2023    Lipids  10/24/2023    Shingles vaccine  Completed    Pneumococcal 65+ years Vaccine  Completed    Hepatitis A vaccine  Aged Out    Hib vaccine  Aged Out    Meningococcal (ACWY) vaccine  Aged Out       Subjective:      Review of Systems   Constitutional:  Negative for appetite change, chills, fatigue and fever. HENT:  Negative for dental problem, rhinorrhea, sinus pressure, sneezing and sore throat. Respiratory:  Negative for cough, chest tightness and shortness of breath. Cardiovascular:  Negative for chest pain, palpitations and leg swelling. Gastrointestinal:  Negative for abdominal pain, constipation, diarrhea, nausea and vomiting. Endocrine: Negative for polydipsia, polyphagia and polyuria. Genitourinary:  Negative for dysuria, frequency, hematuria and urgency. Skin:  Negative for color change and rash. Neurological:  Negative for dizziness, seizures, syncope, weakness, light-headedness, numbness and headaches. Hematological:  Negative for adenopathy. Objective:     /80   Pulse 77   Ht 5' 8\" (1.727 m)   Wt 227 lb (103 kg)   SpO2 96%   BMI 34.52 kg/m²   Physical Exam  Vitals and nursing note reviewed. Constitutional:       Appearance: Normal appearance. HENT:      Head: No raccoon eyes, abrasion, contusion or laceration. Mouth/Throat:      Tongue: Tongue does not deviate from midline.    Eyes:      Extraocular Movements: Extraocular movements intact. Neck:      Thyroid: No thyromegaly. Cardiovascular:      Rate and Rhythm: Normal rate and regular rhythm. Heart sounds: Normal heart sounds. Pulmonary:      Effort: Pulmonary effort is normal.      Breath sounds: Normal breath sounds. Musculoskeletal:      Right lower leg: No edema. Left lower leg: No edema. Neurological:      General: No focal deficit present. Mental Status: He is alert and oriented to person, place, and time. Cranial Nerves: No cranial nerve deficit. Sensory: No sensory deficit. Motor: No weakness. Coordination: Coordination normal.      Gait: Gait normal.      Deep Tendon Reflexes: Reflexes normal.   Psychiatric:         Mood and Affect: Mood normal.         Behavior: Behavior normal.       Assessment:       Diagnosis Orders   1. Type 2 diabetes mellitus without complication, without long-term current use of insulin (HCC)  POCT glycosylated hemoglobin (Hb A1C)    Continuous Blood Gluc  (FREESTYLE MARSHAL 14 DAY READER) SAVANNAH    Continuous Blood Gluc Sensor (FREESTYLE MARSHAL 14 DAY SENSOR) MISC      2. Mixed hyperlipidemia  atorvastatin (LIPITOR) 40 MG tablet      3. Balance disorder  External Referral To Physical Therapy      4. Leg weakness, bilateral  External Referral To Physical Therapy      5. History of recent fall  External Referral To Physical Therapy           Plan:    Print the thyroid labs off for him. Restart Lipitor  Samples of Nurtec or Ubrelvy  PT for high risk of fall  Use walker now for getting aroudn especially at night  Printed off the [facility] to get at [facility] for coverage. Return in about 3 months (around 2/14/2023).     Orders Placed This Encounter   Procedures    External Referral To Physical Therapy     Referral Priority:   Routine     Referral Type:   Eval and Treat     Referral Reason:   Specialty Services Required     Requested Specialty:   Physical Therapist     Number of Visits Requested:   1    POCT glycosylated hemoglobin (Hb A1C)       Orders Placed This Encounter   Medications    amLODIPine (NORVASC) 10 MG tablet     Sig: Take 1 tablet by mouth daily     Dispense:  90 tablet     Refill:  1    Continuous Blood Gluc  (FREESTYLE MARSHAL 14 DAY READER) SAVANNAH     Sig: Testing BID. Testing BID. Dispense:  1 each     Refill:  1    Continuous Blood Gluc Sensor (FREESTYLE MARSHAL 14 DAY SENSOR) MISC     Sig: Change sensor every 14 days     Dispense:  6 each     Refill:  3    atorvastatin (LIPITOR) 40 MG tablet     Sig: TAKE 1 TABLET BY MOUTH NIGHTLY     Dispense:  90 tablet     Refill:  3         Patient given educational materials - see patient instructions. Discussed use, benefit, and side effects of prescribed medications. All patient questions answered. Pt voiced understanding. Reviewed health maintenance. Instructed to continue current medications, diet and exercise. Patient agreed with treatment plan. Follow up as directed.      Electronically signed by Mera Chow PA-C on 11/14/2022 at 10:23 AM

## 2022-11-21 ENCOUNTER — OFFICE VISIT (OUTPATIENT)
Dept: PULMONOLOGY | Age: 82
End: 2022-11-21
Payer: MEDICARE

## 2022-11-21 VITALS
BODY MASS INDEX: 35.01 KG/M2 | HEART RATE: 72 BPM | DIASTOLIC BLOOD PRESSURE: 78 MMHG | WEIGHT: 231 LBS | SYSTOLIC BLOOD PRESSURE: 119 MMHG | HEIGHT: 68 IN | OXYGEN SATURATION: 95 % | RESPIRATION RATE: 16 BRPM

## 2022-11-21 DIAGNOSIS — G47.33 OBSTRUCTIVE SLEEP APNEA SYNDROME: Primary | ICD-10-CM

## 2022-11-21 DIAGNOSIS — Z23 NEED FOR INFLUENZA VACCINATION: ICD-10-CM

## 2022-11-21 DIAGNOSIS — J44.9 CHRONIC OBSTRUCTIVE PULMONARY DISEASE, UNSPECIFIED COPD TYPE (HCC): ICD-10-CM

## 2022-11-21 DIAGNOSIS — J45.30 MILD PERSISTENT ASTHMA WITHOUT COMPLICATION: ICD-10-CM

## 2022-11-21 DIAGNOSIS — E66.01 SEVERE OBESITY (BMI 35.0-35.9 WITH COMORBIDITY) (HCC): ICD-10-CM

## 2022-11-21 PROCEDURE — 3023F SPIROM DOC REV: CPT | Performed by: NURSE PRACTITIONER

## 2022-11-21 PROCEDURE — 3078F DIAST BP <80 MM HG: CPT | Performed by: NURSE PRACTITIONER

## 2022-11-21 PROCEDURE — 90694 VACC AIIV4 NO PRSRV 0.5ML IM: CPT | Performed by: NURSE PRACTITIONER

## 2022-11-21 PROCEDURE — 1036F TOBACCO NON-USER: CPT | Performed by: NURSE PRACTITIONER

## 2022-11-21 PROCEDURE — 1123F ACP DISCUSS/DSCN MKR DOCD: CPT | Performed by: NURSE PRACTITIONER

## 2022-11-21 PROCEDURE — G8417 CALC BMI ABV UP PARAM F/U: HCPCS | Performed by: NURSE PRACTITIONER

## 2022-11-21 PROCEDURE — G0008 ADMIN INFLUENZA VIRUS VAC: HCPCS | Performed by: NURSE PRACTITIONER

## 2022-11-21 PROCEDURE — G8427 DOCREV CUR MEDS BY ELIG CLIN: HCPCS | Performed by: NURSE PRACTITIONER

## 2022-11-21 PROCEDURE — 99214 OFFICE O/P EST MOD 30 MIN: CPT | Performed by: NURSE PRACTITIONER

## 2022-11-21 PROCEDURE — G8484 FLU IMMUNIZE NO ADMIN: HCPCS | Performed by: NURSE PRACTITIONER

## 2022-11-21 PROCEDURE — 3074F SYST BP LT 130 MM HG: CPT | Performed by: NURSE PRACTITIONER

## 2022-11-21 RX ORDER — ALBUTEROL SULFATE 2.5 MG/3ML
SOLUTION RESPIRATORY (INHALATION)
Qty: 120 EACH | Refills: 11 | Status: SHIPPED | OUTPATIENT
Start: 2022-11-21

## 2022-11-21 RX ORDER — BUDESONIDE 0.5 MG/2ML
INHALANT ORAL
Qty: 120 ML | Refills: 5 | Status: SHIPPED | OUTPATIENT
Start: 2022-11-21

## 2022-11-21 ASSESSMENT — ENCOUNTER SYMPTOMS
GASTROINTESTINAL NEGATIVE: 1
RESPIRATORY NEGATIVE: 1
EYES NEGATIVE: 1
ALLERGIC/IMMUNOLOGIC NEGATIVE: 1

## 2022-12-11 DIAGNOSIS — N40.1 BPH WITH OBSTRUCTION/LOWER URINARY TRACT SYMPTOMS: ICD-10-CM

## 2022-12-11 DIAGNOSIS — N13.8 BPH WITH OBSTRUCTION/LOWER URINARY TRACT SYMPTOMS: ICD-10-CM

## 2022-12-12 RX ORDER — TAMSULOSIN HYDROCHLORIDE 0.4 MG/1
CAPSULE ORAL
Qty: 90 CAPSULE | Refills: 0 | Status: SHIPPED | OUTPATIENT
Start: 2022-12-12

## 2022-12-13 DIAGNOSIS — E11.9 TYPE 2 DIABETES MELLITUS WITHOUT COMPLICATION, WITHOUT LONG-TERM CURRENT USE OF INSULIN (HCC): ICD-10-CM

## 2022-12-13 NOTE — TELEPHONE ENCOUNTER
I don't think these are covered at [facility]. Would they like them sent to [facility] or [facility]. At [facility], they will be shipped to the house.

## 2022-12-20 RX ORDER — FLASH GLUCOSE SENSOR
KIT MISCELLANEOUS
Refills: 0 | OUTPATIENT
Start: 2022-12-20

## 2022-12-22 DIAGNOSIS — E03.9 HYPOTHYROIDISM, UNSPECIFIED TYPE: ICD-10-CM

## 2022-12-23 RX ORDER — LEVOTHYROXINE SODIUM 0.12 MG/1
TABLET ORAL
Qty: 30 TABLET | Refills: 0 | Status: SHIPPED | OUTPATIENT
Start: 2022-12-23

## 2022-12-27 DIAGNOSIS — E05.80 IATROGENIC HYPERTHYROIDISM: ICD-10-CM

## 2022-12-27 LAB
THYROXINE, FREE: 1.41 NG/DL (ref 0.93–1.7)
TSH SERPL DL<=0.05 MIU/L-ACNC: 0.36 UIU/ML (ref 0.3–5)

## 2023-01-09 ENCOUNTER — TELEPHONE (OUTPATIENT)
Dept: PULMONOLOGY | Age: 83
End: 2023-01-09

## 2023-01-09 DIAGNOSIS — G47.31 CENTRAL SLEEP APNEA: Primary | ICD-10-CM

## 2023-01-10 NOTE — TELEPHONE ENCOUNTER
Created a new order for his BiPap, to go to his new DME company. Please sign off on the script, I'll fax it.

## 2023-01-26 DIAGNOSIS — E03.9 HYPOTHYROIDISM, UNSPECIFIED TYPE: ICD-10-CM

## 2023-01-26 RX ORDER — LEVOTHYROXINE SODIUM 0.12 MG/1
TABLET ORAL
Qty: 90 TABLET | Refills: 3 | Status: SHIPPED | OUTPATIENT
Start: 2023-01-26

## 2023-02-13 ENCOUNTER — TELEPHONE (OUTPATIENT)
Dept: PULMONOLOGY | Age: 83
End: 2023-02-13

## 2023-02-13 DIAGNOSIS — G47.31 CENTRAL SLEEP APNEA: Primary | ICD-10-CM

## 2023-02-13 NOTE — TELEPHONE ENCOUNTER
Gaudencio Pak MD  You 12 minutes ago (1:58 PM)     SK  I changed the backup rate to 15. Once the patient gets the machine, please have them send the compliance information after using it for a month so we can see its effectiveness.   Thank you

## 2023-02-13 NOTE — TELEPHONE ENCOUNTER
Last order written before the one in January stated: BI-LEVEL Auto Adjust (RAD, bi-level  pressure capability  without backup rate feature) Max IPAP 18 cm H2O / Min EPAP 5 cm H2O / PS 16cm H2O     So it appears patient must use [facility] however they keep telling the son and daughter in law that they cant get the equipment needed. [facility] doesn't deal with those machines. [employer] does however they are 60 days behind. Franklyn Fabian, spoke to [place]. She is reaching out to her supplier to see if they have a Straight BiPap ST set at 20/16 with a backup rage of 10 and she will let me know. Writer is going to verify with Dr Alessandro Stahl that this is the correct machine with settings and back up rate.

## 2023-02-13 NOTE — TELEPHONE ENCOUNTER
Can you help me with this? [facility] called, they reviewed his studies, office notes and what machine the patient is on now. Patient is currently on a Bi level ST w/backup rate, not a standard Bi level machine. They dont deal with those machines. If patient needs a new machine he will have to go back to [facility] to obtain. Please review and advise?

## 2023-02-13 NOTE — TELEPHONE ENCOUNTER
Mery from [place] said they can get the machine to the office in a few days. They will then call and get the patient set up. Son was informed via voice mail. Writer will track, once patient starts using the machine I will pull a compliance report and see how its working for him.      Faxed demos, (no ins card avail), order w/settings, office notes and sleep studies to [address]

## 2023-02-13 NOTE — TELEPHONE ENCOUNTER
Dr Alfredo Elliott, there are many orders for his BiPap machine in the chart. Final sleep study states: Patient is to be on Bilevel ST 20/16 with a back up rate of 10. Is this correct? If so, please sign the order, I will fax to [facility].

## 2023-03-14 DIAGNOSIS — N40.1 BPH WITH OBSTRUCTION/LOWER URINARY TRACT SYMPTOMS: ICD-10-CM

## 2023-03-14 DIAGNOSIS — E11.9 TYPE 2 DIABETES MELLITUS WITHOUT COMPLICATION, WITHOUT LONG-TERM CURRENT USE OF INSULIN (HCC): ICD-10-CM

## 2023-03-14 DIAGNOSIS — I50.32 CHRONIC DIASTOLIC (CONGESTIVE) HEART FAILURE (HCC): ICD-10-CM

## 2023-03-14 DIAGNOSIS — N13.8 BPH WITH OBSTRUCTION/LOWER URINARY TRACT SYMPTOMS: ICD-10-CM

## 2023-03-14 RX ORDER — ASPIRIN 81 MG/1
TABLET, CHEWABLE ORAL
Qty: 90 TABLET | Refills: 3 | Status: SHIPPED | OUTPATIENT
Start: 2023-03-14

## 2023-03-14 RX ORDER — TAMSULOSIN HYDROCHLORIDE 0.4 MG/1
CAPSULE ORAL
Qty: 90 CAPSULE | Refills: 0 | Status: SHIPPED | OUTPATIENT
Start: 2023-03-14

## 2023-03-14 RX ORDER — FLASH GLUCOSE SENSOR
KIT MISCELLANEOUS
Qty: 3 EACH | Refills: 5 | Status: SHIPPED | OUTPATIENT
Start: 2023-03-14

## 2023-04-19 ENCOUNTER — TELEPHONE (OUTPATIENT)
Dept: PRIMARY CARE CLINIC | Age: 83
End: 2023-04-19

## 2023-05-08 ENCOUNTER — OFFICE VISIT (OUTPATIENT)
Dept: PULMONOLOGY | Age: 83
End: 2023-05-08
Payer: MEDICARE

## 2023-05-08 VITALS
OXYGEN SATURATION: 94 % | SYSTOLIC BLOOD PRESSURE: 123 MMHG | RESPIRATION RATE: 16 BRPM | DIASTOLIC BLOOD PRESSURE: 69 MMHG | HEART RATE: 67 BPM | WEIGHT: 234 LBS | HEIGHT: 68 IN | BODY MASS INDEX: 35.46 KG/M2

## 2023-05-08 DIAGNOSIS — G47.33 OBSTRUCTIVE SLEEP APNEA SYNDROME: ICD-10-CM
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
## 2023-05-08 DIAGNOSIS — E66.01 SEVERE OBESITY (BMI 35.0-35.9 WITH COMORBIDITY) (HCC): ICD-10-CM

## 2023-05-08 DIAGNOSIS — I50.32 CHRONIC DIASTOLIC (CONGESTIVE) HEART FAILURE (HCC): ICD-10-CM

## 2023-05-08 DIAGNOSIS — E66.01 SEVERE OBESITY (BMI 35.0-39.9) WITH COMORBIDITY (HCC): ICD-10-CM

## 2023-05-08 DIAGNOSIS — G47.31 CENTRAL SLEEP APNEA: ICD-10-CM

## 2023-05-08 DIAGNOSIS — M79.89 SWELLING OF LEFT LOWER EXTREMITY: Primary | ICD-10-CM
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
## 2023-05-08 DIAGNOSIS — J44.9 CHRONIC OBSTRUCTIVE PULMONARY DISEASE, UNSPECIFIED COPD TYPE (HCC): ICD-10-CM

## 2023-05-08 PROCEDURE — G8417 CALC BMI ABV UP PARAM F/U: HCPCS | Performed by: NURSE PRACTITIONER

## 2023-05-08 PROCEDURE — 99214 OFFICE O/P EST MOD 30 MIN: CPT | Performed by: NURSE PRACTITIONER

## 2023-05-08 PROCEDURE — 3074F SYST BP LT 130 MM HG: CPT | Performed by: NURSE PRACTITIONER

## 2023-05-08 PROCEDURE — 1123F ACP DISCUSS/DSCN MKR DOCD: CPT | Performed by: NURSE PRACTITIONER

## 2023-05-08 PROCEDURE — G8427 DOCREV CUR MEDS BY ELIG CLIN: HCPCS | Performed by: NURSE PRACTITIONER

## 2023-05-08 PROCEDURE — 3078F DIAST BP <80 MM HG: CPT | Performed by: NURSE PRACTITIONER

## 2023-05-08 PROCEDURE — 3023F SPIROM DOC REV: CPT | Performed by: NURSE PRACTITIONER

## 2023-05-08 PROCEDURE — 1036F TOBACCO NON-USER: CPT | Performed by: NURSE PRACTITIONER

## 2023-05-08 ASSESSMENT — ENCOUNTER SYMPTOMS
ALLERGIC/IMMUNOLOGIC NEGATIVE: 1
EYES NEGATIVE: 1
GASTROINTESTINAL NEGATIVE: 1

## 2023-05-11 ENCOUNTER — HOSPITAL ENCOUNTER (OUTPATIENT)
Dept: VASCULAR LAB | Age: 83
End: 2023-05-11
Payer: MEDICARE

## 2023-05-11 ENCOUNTER — HOSPITAL ENCOUNTER (OUTPATIENT)
Age: 83
Discharge: HOME | End: 2023-05-13

## 2023-05-11 ENCOUNTER — HOSPITAL ENCOUNTER (OUTPATIENT)
Dept: GENERAL RADIOLOGY | Age: 83
Discharge: HOME | End: 2023-05-13
Payer: MEDICARE
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
## 2023-05-11 PROCEDURE — 73610 X-RAY EXAM OF ANKLE: CPT

## 2023-05-15 ENCOUNTER — OFFICE VISIT (OUTPATIENT)
Dept: PRIMARY CARE CLINIC | Age: 83
End: 2023-05-15
Payer: MEDICARE

## 2023-05-15 VITALS
DIASTOLIC BLOOD PRESSURE: 68 MMHG | BODY MASS INDEX: 35.46 KG/M2 | HEIGHT: 68 IN | OXYGEN SATURATION: 96 % | HEART RATE: 58 BPM | SYSTOLIC BLOOD PRESSURE: 126 MMHG | WEIGHT: 234 LBS

## 2023-05-15 DIAGNOSIS — E11.9 TYPE 2 DIABETES MELLITUS WITHOUT COMPLICATION, WITHOUT LONG-TERM CURRENT USE OF INSULIN (HCC): ICD-10-CM

## 2023-05-15 DIAGNOSIS — R26.89 BALANCE DISORDER: ICD-10-CM

## 2023-05-15 DIAGNOSIS — M25.50 ARTHRALGIA, UNSPECIFIED JOINT: ICD-10-CM

## 2023-05-15 DIAGNOSIS — R09.89 ABNORMAL FINDING OF LUNG: ICD-10-CM

## 2023-05-15 DIAGNOSIS — I10 BENIGN ESSENTIAL HYPERTENSION: ICD-10-CM

## 2023-05-15 DIAGNOSIS — F32.5 MAJOR DEPRESSIVE DISORDER WITH SINGLE EPISODE, IN FULL REMISSION (HCC): ICD-10-CM

## 2023-05-15 DIAGNOSIS — R42 DIZZINESS: ICD-10-CM

## 2023-05-15 DIAGNOSIS — S82.892A CLOSED FRACTURE OF LEFT ANKLE, INITIAL ENCOUNTER: Primary | ICD-10-CM

## 2023-05-15 DIAGNOSIS — Z91.81 AT HIGH RISK FOR FALLS: ICD-10-CM

## 2023-05-15 DIAGNOSIS — E78.2 MIXED HYPERLIPIDEMIA: ICD-10-CM

## 2023-05-15 LAB
BILIRUBIN, POC: NORMAL
BLOOD URINE, POC: NORMAL
CLARITY, POC: NORMAL
COLOR, POC: NORMAL
CREATININE URINE POCT: NORMAL
GLUCOSE URINE, POC: NORMAL
KETONES, POC: NORMAL
LEUKOCYTE EST, POC: NORMAL
MICROALBUMIN/CREAT 24H UR: NORMAL MG/G{CREAT}
MICROALBUMIN/CREAT UR-RTO: <30
NITRITE, POC: NORMAL
PH, POC: 6
PROTEIN, POC: NORMAL
SPECIFIC GRAVITY, POC: 1.02
UROBILINOGEN, POC: 1

## 2023-05-15 PROCEDURE — 99215 OFFICE O/P EST HI 40 MIN: CPT | Performed by: PHYSICIAN ASSISTANT

## 2023-05-15 PROCEDURE — 82044 UR ALBUMIN SEMIQUANTITATIVE: CPT | Performed by: PHYSICIAN ASSISTANT

## 2023-05-15 PROCEDURE — G8417 CALC BMI ABV UP PARAM F/U: HCPCS | Performed by: PHYSICIAN ASSISTANT

## 2023-05-15 PROCEDURE — 3078F DIAST BP <80 MM HG: CPT | Performed by: PHYSICIAN ASSISTANT

## 2023-05-15 PROCEDURE — 81003 URINALYSIS AUTO W/O SCOPE: CPT | Performed by: PHYSICIAN ASSISTANT

## 2023-05-15 PROCEDURE — 1036F TOBACCO NON-USER: CPT | Performed by: PHYSICIAN ASSISTANT

## 2023-05-15 PROCEDURE — 3074F SYST BP LT 130 MM HG: CPT | Performed by: PHYSICIAN ASSISTANT

## 2023-05-15 PROCEDURE — G8427 DOCREV CUR MEDS BY ELIG CLIN: HCPCS | Performed by: PHYSICIAN ASSISTANT

## 2023-05-15 PROCEDURE — 1123F ACP DISCUSS/DSCN MKR DOCD: CPT | Performed by: PHYSICIAN ASSISTANT

## 2023-05-15 RX ORDER — ACETAMINOPHEN 500 MG
TABLET ORAL
Qty: 180 TABLET | Refills: 2 | Status: SHIPPED | OUTPATIENT
Start: 2023-05-15

## 2023-05-15 RX ORDER — ATORVASTATIN CALCIUM 80 MG/1
TABLET, FILM COATED ORAL
Qty: 90 TABLET | Refills: 0 | Status: SHIPPED
Start: 2023-05-15

## 2023-05-15 ASSESSMENT — PATIENT HEALTH QUESTIONNAIRE - PHQ9
SUM OF ALL RESPONSES TO PHQ QUESTIONS 1-9: 0
6. FEELING BAD ABOUT YOURSELF - OR THAT YOU ARE A FAILURE OR HAVE LET YOURSELF OR YOUR FAMILY DOWN: 0
8. MOVING OR SPEAKING SO SLOWLY THAT OTHER PEOPLE COULD HAVE NOTICED. OR THE OPPOSITE, BEING SO FIGETY OR RESTLESS THAT YOU HAVE BEEN MOVING AROUND A LOT MORE THAN USUAL: 0
SUM OF ALL RESPONSES TO PHQ QUESTIONS 1-9: 0
4. FEELING TIRED OR HAVING LITTLE ENERGY: 0
9. THOUGHTS THAT YOU WOULD BE BETTER OFF DEAD, OR OF HURTING YOURSELF: 0
2. FEELING DOWN, DEPRESSED OR HOPELESS: 0
SUM OF ALL RESPONSES TO PHQ9 QUESTIONS 1 & 2: 0
3. TROUBLE FALLING OR STAYING ASLEEP: 0
7. TROUBLE CONCENTRATING ON THINGS, SUCH AS READING THE NEWSPAPER OR WATCHING TELEVISION: 0
10. IF YOU CHECKED OFF ANY PROBLEMS, HOW DIFFICULT HAVE THESE PROBLEMS MADE IT FOR YOU TO DO YOUR WORK, TAKE CARE OF THINGS AT HOME, OR GET ALONG WITH OTHER PEOPLE: 0
1. LITTLE INTEREST OR PLEASURE IN DOING THINGS: 0
5. POOR APPETITE OR OVEREATING: 0
SUM OF ALL RESPONSES TO PHQ QUESTIONS 1-9: 0
SUM OF ALL RESPONSES TO PHQ QUESTIONS 1-9: 0

## 2023-05-15 ASSESSMENT — ENCOUNTER SYMPTOMS
SHORTNESS OF BREATH: 1
CHEST TIGHTNESS: 0
ABDOMINAL PAIN: 0
APNEA: 0
COLOR CHANGE: 0
COUGH: 0

## 2023-05-15 NOTE — PROGRESS NOTES
[facility] PRIMARY CARE  [address] [facility] [address]  Dept: [address] [facility] Williams Marshall is a 81 y.o. male who presents today for his medical conditions/complaints as noted below. Chief Complaint   Patient presents with    Diabetes     Patient does not checking sugars        HPI:     HPI  Ankle fracture: feel about 1 week ago, pulmo provider ordered the x-ray. Dizziness: he says no  Son in law said he fell about a week ago. Sugar readings: Free style Trey--did not get   BPs are running fine    Should be on Zoloft and hs been taking Paxil    HAs are controlled with Tylenol and he needs a refill.    LDL Cholesterol (mg/dL)   Date Value   12/16/2020 120   02/01/2016 160 (H)   01/08/2013 129 (H)     LDL Calculated (mg/dL)   Date Value   10/24/2022 128   07/09/2021 122   11/28/2017 122       (goal LDL is <100)   AST (U/L)   Date Value   03/15/2022 25     ALT (U/L)   Date Value   03/15/2022 27     BUN (mg/dL)   Date Value   08/19/2021 15     BP Readings from Last 3 Encounters:   05/15/23 126/68   05/08/23 123/69   11/21/22 119/78          (goal 120/80)    Past Medical History:   Diagnosis Date    Depression     Hyperlipidemia     Hypertension     Hypothyroidism       Past Surgical History:   Procedure Laterality Date    CARDIAC CATHETERIZATION  09/23/2021    CORONARY ANGIOPLASTY WITH STENT PLACEMENT  2013    EYE SURGERY      as a child       Family History   Problem Relation Age of Onset    Cancer Mother         breast    Diabetes Brother     Heart Disease Brother     High Blood Pressure Brother     Diabetes Maternal Uncle     Heart Disease Maternal Uncle     Cancer Other         throat       Social History     Tobacco Use    Smoking status: Never     Passive exposure: Yes    Smokeless tobacco: Never   Substance Use Topics    Alcohol use: No      Current Outpatient Medications   Medication Sig Dispense Refill    atorvastatin (LIPITOR) 80 MG tablet TAKE 1

## 2023-05-17 LAB
AVERAGE GLUCOSE: 128
BASOPHILS ABSOLUTE: NORMAL
BASOPHILS RELATIVE PERCENT: NORMAL
BUN BLDV-MCNC: NORMAL MG/DL
CALCIUM SERPL-MCNC: NORMAL MG/DL
CHLORIDE BLD-SCNC: NORMAL MMOL/L
CO2: NORMAL
CREAT SERPL-MCNC: NORMAL MG/DL
EGFR: NORMAL
EOSINOPHILS ABSOLUTE: NORMAL
EOSINOPHILS RELATIVE PERCENT: NORMAL
GLUCOSE BLD-MCNC: NORMAL MG/DL
HBA1C MFR BLD: 6.1 %
HCT VFR BLD CALC: NORMAL %
HEMOGLOBIN: NORMAL
LYMPHOCYTES ABSOLUTE: NORMAL
LYMPHOCYTES RELATIVE PERCENT: NORMAL
MCH RBC QN AUTO: NORMAL PG
MCHC RBC AUTO-ENTMCNC: NORMAL G/DL
MCV RBC AUTO: NORMAL FL
MONOCYTES ABSOLUTE: NORMAL
MONOCYTES RELATIVE PERCENT: NORMAL
NEUTROPHILS ABSOLUTE: NORMAL
NEUTROPHILS RELATIVE PERCENT: NORMAL
PDW BLD-RTO: NORMAL %
PLATELET # BLD: NORMAL 10*3/UL
PMV BLD AUTO: NORMAL FL
POTASSIUM SERPL-SCNC: NORMAL MMOL/L
RBC # BLD: NORMAL 10*6/UL
SODIUM BLD-SCNC: NORMAL MMOL/L
WBC # BLD: NORMAL 10*3/UL

## 2023-05-18 DIAGNOSIS — E11.9 TYPE 2 DIABETES MELLITUS WITHOUT COMPLICATION, WITHOUT LONG-TERM CURRENT USE OF INSULIN (HCC): ICD-10-CM

## 2023-05-18 DIAGNOSIS — R09.89 ABNORMAL FINDING OF LUNG: ICD-10-CM

## 2023-05-18 DIAGNOSIS — R42 DIZZINESS: ICD-10-CM

## 2023-06-05 ENCOUNTER — OFFICE VISIT (OUTPATIENT)
Dept: ORTHOPEDIC SURGERY | Age: 83
End: 2023-06-05
Payer: MEDICARE

## 2023-06-05 VITALS — OXYGEN SATURATION: 100 % | HEIGHT: 64 IN | WEIGHT: 234 LBS | RESPIRATION RATE: 16 BRPM | BODY MASS INDEX: 39.95 KG/M2

## 2023-06-05 DIAGNOSIS — S82.899A FRACTURE OF ANKLE, CLOSED, UNSPECIFIED LATERALITY, INITIAL ENCOUNTER: Primary | ICD-10-CM

## 2023-06-05 DIAGNOSIS — M25.572 LEFT ANKLE PAIN, UNSPECIFIED CHRONICITY: Primary | ICD-10-CM

## 2023-06-05 PROCEDURE — 1123F ACP DISCUSS/DSCN MKR DOCD: CPT | Performed by: ORTHOPAEDIC SURGERY

## 2023-06-05 PROCEDURE — 1036F TOBACCO NON-USER: CPT | Performed by: ORTHOPAEDIC SURGERY

## 2023-06-05 PROCEDURE — 99204 OFFICE O/P NEW MOD 45 MIN: CPT | Performed by: ORTHOPAEDIC SURGERY

## 2023-06-05 PROCEDURE — G8427 DOCREV CUR MEDS BY ELIG CLIN: HCPCS | Performed by: ORTHOPAEDIC SURGERY

## 2023-06-05 PROCEDURE — G8417 CALC BMI ABV UP PARAM F/U: HCPCS | Performed by: ORTHOPAEDIC SURGERY

## 2023-06-05 RX ORDER — LIDOCAINE 4 G/G
PATCH TOPICAL
Qty: 14 PATCH | Refills: 0 | Status: SHIPPED | OUTPATIENT
Start: 2023-06-05

## 2023-06-05 NOTE — PROGRESS NOTES
3    levothyroxine (SYNTHROID) 125 MCG tablet, TAKE 1 TABLET BY MOUTH EVERY DAY, Disp: 90 tablet, Rfl: 3    metFORMIN (GLUCOPHAGE) 500 MG tablet, TAKE 1 TABLET BY MOUTH EVERY DAY WITH BREAKFAST, Disp: 180 tablet, Rfl: 1    budesonide (PULMICORT) 0.5 MG/2ML nebulizer suspension, INHALE 1 VIAL VIA NEBULIZER TWO TIMES A DAY, Disp: 120 mL, Rfl: 5    albuterol (PROVENTIL) (2.5 MG/3ML) 0.083% nebulizer solution, INHALE THE CONTENTS OF 1 VIAL EVERY 8 HOURS AS NEEDED, Disp: 120 each, Rfl: 11    amLODIPine (NORVASC) 10 MG tablet, Take 1 tablet by mouth daily, Disp: 90 tablet, Rfl: 1    Continuous Blood Gluc  (FREESTYLE MARSHAL 14 DAY READER) SAVANNAH, Testing BID. Testing BID., Disp: 1 each, Rfl: 1    carvedilol (COREG) 6.25 MG tablet, Take 1 tablet by mouth 2 times daily, Disp: , Rfl: 3    Omega-3 Fatty Acids (FISH OIL OMEGA-3 PO), Take by mouth, Disp: , Rfl:     losartan (COZAAR) 100 MG tablet, Take 1 tablet by mouth 2 times daily, Disp: 180 tablet, Rfl: 1    Multiple Vitamins-Minerals (ONE DAILY MENS) TABS, Take 1 tablet by mouth daily, Disp: , Rfl:      Allergies:    Amiodarone    Family History:  family history includes Cancer in his mother and another family member; Diabetes in his brother and maternal uncle; Heart Disease in his brother and maternal uncle; High Blood Pressure in his brother. Social History:   Social History     Occupational History    Not on file   Tobacco Use    Smoking status: Never     Passive exposure: Yes    Smokeless tobacco: Never   Substance and Sexual Activity    Alcohol use: No    Drug use: No    Sexual activity: Not Currently     Retired, worked for [employer]    OBJECTIVE:  Resp 16   Ht 5' 4\" (1.626 m)   Wt 234 lb (106.1 kg)   SpO2 100%   BMI 40.17 kg/m²    Psych: awake, alert  Cardio:  well perfused extremities, no cyanosis  Resp:  normal respiratory effort  Musculoskeletal:    Affected lower extremity:    Vascular: Limb well perfused, compartments soft/compressible.    Skin: No Quality 402: Tobacco Use And Help With Quitting Among Adolescents: Patient screened for tobacco and never smoked Detail Level: Detailed Quality 130: Documentation Of Current Medications In The Medical Record: Current Medications Documented

## 2023-06-13 DIAGNOSIS — E11.9 TYPE 2 DIABETES MELLITUS WITHOUT COMPLICATION, WITHOUT LONG-TERM CURRENT USE OF INSULIN (HCC): ICD-10-CM

## 2023-06-13 DIAGNOSIS — N40.1 BPH WITH OBSTRUCTION/LOWER URINARY TRACT SYMPTOMS: ICD-10-CM

## 2023-06-13 DIAGNOSIS — N13.8 BPH WITH OBSTRUCTION/LOWER URINARY TRACT SYMPTOMS: ICD-10-CM

## 2023-06-13 RX ORDER — TAMSULOSIN HYDROCHLORIDE 0.4 MG/1
CAPSULE ORAL
Qty: 90 CAPSULE | Refills: 1 | Status: SHIPPED | OUTPATIENT
Start: 2023-06-13

## 2023-06-13 RX ORDER — FLASH GLUCOSE SENSOR
KIT MISCELLANEOUS
Qty: 6 EACH | Refills: 3 | Status: SHIPPED | OUTPATIENT
Start: 2023-06-13

## 2023-06-13 NOTE — TELEPHONE ENCOUNTER
LAST VISIT:   5/15/2023     Future Appointments   Date Time Provider [address]   7/3/2023  8:00 AM Adi Michaels MD [facility]   8/15/2023  1:30 PM Colletta Peaches, PA-C [facility] [place]   11/14/2023  2:30 PM Magaly Fuller MD [address] [place] [address]

## 2023-06-30 DIAGNOSIS — S82.899A FRACTURE OF ANKLE, CLOSED, UNSPECIFIED LATERALITY, INITIAL ENCOUNTER: Primary | ICD-10-CM

## 2023-07-03 ENCOUNTER — OFFICE VISIT (OUTPATIENT)
Dept: ORTHOPEDIC SURGERY | Age: 83
End: 2023-07-03
Payer: MEDICARE

## 2023-07-03 VITALS — WEIGHT: 234 LBS | OXYGEN SATURATION: 100 % | HEIGHT: 64 IN | RESPIRATION RATE: 16 BRPM | BODY MASS INDEX: 39.95 KG/M2

## 2023-07-03 DIAGNOSIS — S82.899D FRACTURE OF ANKLE, CLOSED, UNSPECIFIED LATERALITY, WITH ROUTINE HEALING, SUBSEQUENT ENCOUNTER: Primary | ICD-10-CM

## 2023-07-03 PROCEDURE — G8417 CALC BMI ABV UP PARAM F/U: HCPCS | Performed by: ORTHOPAEDIC SURGERY

## 2023-07-03 PROCEDURE — 1123F ACP DISCUSS/DSCN MKR DOCD: CPT | Performed by: ORTHOPAEDIC SURGERY

## 2023-07-03 PROCEDURE — 99212 OFFICE O/P EST SF 10 MIN: CPT | Performed by: ORTHOPAEDIC SURGERY

## 2023-07-03 PROCEDURE — 1036F TOBACCO NON-USER: CPT | Performed by: ORTHOPAEDIC SURGERY

## 2023-07-03 PROCEDURE — G8427 DOCREV CUR MEDS BY ELIG CLIN: HCPCS | Performed by: ORTHOPAEDIC SURGERY

## 2023-07-03 NOTE — PROGRESS NOTES
[address]  [address] [address]  Dept: [phone]    Ambulatory Orthopedic Consult      CHIEF COMPLAINT:    Chief Complaint   Patient presents with    Ankle Pain     Left        HISTORY OF PRESENT ILLNESS:      The patient is a 81 y.o. male who is being seen for evaluation of the above, which began around 5/1/2023 secondary to a fall from standing height  . At today's visit, he is using a cane. History is obtained today from:   [x]  the patient     [x]  EMR     [x]  one family member/friend    []  multiple family members/friends    []  other:      Notably, the patient is unable to clarify multiple medical details surrounding his history. INTERVAL HISTORY 7/3/2023:  He is seen again today in the office for follow up of a previous issue (as above). Since being seen last, the patient is doing better. At today's visit, he is using a removable brace. History is obtained today from:   [x]  the patient     []  EMR     []  one family member/friend    []  multiple family members/friends    []  other:        REVIEW OF SYSTEMS:  Musculoskeletal: See HPI for pertinent positives     Past Medical History:    He  has a past medical history of Depression, Hyperlipidemia, Hypertension, and Hypothyroidism. Past Surgical History:    He  has a past surgical history that includes Coronary angioplasty with stent (2013); eye surgery; and Cardiac catheterization (09/23/2021).      Current Medications:     Current Outpatient Medications:     Continuous Blood Gluc Sensor (FREESTYLE MARSHAL 14 DAY SENSOR) [facility], APPLY ONE SENSOR TO THE UPPER ARM ONCE EVERY 14 DAYS, Disp: 6 each, Rfl: 3    tamsulosin (FLOMAX) 0.4 MG capsule, TAKE 1 CAPSULE BY MOUTH IN THE EVENING, Disp: 90 capsule, Rfl: 1    clopidogrel (PLAVIX) 75 MG tablet, TAKE 1 TABLET BY MOUTH EVERY DAY, Disp: 90 tablet, Rfl: 1    losartan (COZAAR) 100 MG tablet, TAKE 1 TABLET

## 2023-08-10 NOTE — TELEPHONE ENCOUNTER
LAST VISIT:   5/15/2023     Future Appointments   Date Time Provider [address]   8/21/2023  8:00 AM GHADA Vu  М[place] Yanez   9/25/2023 10:15 AM Espinoza Braun MD [facility]   11/14/2023  2:30 PM Mike Stein MD [facility] Марина Yanez

## 2023-08-21 ENCOUNTER — OFFICE VISIT (OUTPATIENT)
Dept: PRIMARY CARE CLINIC | Age: 83
End: 2023-08-21
Payer: MEDICARE

## 2023-08-21 VITALS
OXYGEN SATURATION: 95 % | HEIGHT: 64 IN | BODY MASS INDEX: 41.45 KG/M2 | WEIGHT: 242.8 LBS | DIASTOLIC BLOOD PRESSURE: 64 MMHG | SYSTOLIC BLOOD PRESSURE: 110 MMHG | HEART RATE: 63 BPM

## 2023-08-21 DIAGNOSIS — E78.2 MIXED HYPERLIPIDEMIA: ICD-10-CM

## 2023-08-21 DIAGNOSIS — E11.9 TYPE 2 DIABETES MELLITUS WITHOUT COMPLICATION, WITHOUT LONG-TERM CURRENT USE OF INSULIN (HCC): ICD-10-CM

## 2023-08-21 DIAGNOSIS — M25.50 ARTHRALGIA, UNSPECIFIED JOINT: ICD-10-CM

## 2023-08-21 DIAGNOSIS — E55.9 VITAMIN D DEFICIENCY: ICD-10-CM

## 2023-08-21 DIAGNOSIS — E03.9 HYPOTHYROIDISM, UNSPECIFIED TYPE: ICD-10-CM

## 2023-08-21 DIAGNOSIS — Z87.81 HISTORY OF VERTEBRAL FRACTURE: ICD-10-CM

## 2023-08-21 DIAGNOSIS — M80.88XA OTHER OSTEOPOROSIS WITH CURRENT PATHOLOGICAL FRACTURE, VERTEBRA(E), INITIAL ENCOUNTER FOR FRACTURE (HCC): ICD-10-CM

## 2023-08-21 DIAGNOSIS — J34.89 SINUS PRESSURE: ICD-10-CM

## 2023-08-21 DIAGNOSIS — D64.9 LOW HEMOGLOBIN: ICD-10-CM

## 2023-08-21 DIAGNOSIS — I10 BENIGN ESSENTIAL HYPERTENSION: Primary | ICD-10-CM

## 2023-08-21 PROBLEM — E66.01 SEVERE OBESITY (BMI 35.0-35.9 WITH COMORBIDITY) (HCC): Status: RESOLVED | Noted: 2021-08-19 | Resolved: 2023-08-21

## 2023-08-21 PROBLEM — L60.0 INGROWN TOENAIL: Status: RESOLVED | Noted: 2021-08-19 | Resolved: 2023-08-21

## 2023-08-21 PROCEDURE — 3074F SYST BP LT 130 MM HG: CPT | Performed by: PHYSICIAN ASSISTANT

## 2023-08-21 PROCEDURE — 3044F HG A1C LEVEL LT 7.0%: CPT | Performed by: PHYSICIAN ASSISTANT

## 2023-08-21 PROCEDURE — 1123F ACP DISCUSS/DSCN MKR DOCD: CPT | Performed by: PHYSICIAN ASSISTANT

## 2023-08-21 PROCEDURE — 99214 OFFICE O/P EST MOD 30 MIN: CPT | Performed by: PHYSICIAN ASSISTANT

## 2023-08-21 PROCEDURE — G8427 DOCREV CUR MEDS BY ELIG CLIN: HCPCS | Performed by: PHYSICIAN ASSISTANT

## 2023-08-21 PROCEDURE — G8417 CALC BMI ABV UP PARAM F/U: HCPCS | Performed by: PHYSICIAN ASSISTANT

## 2023-08-21 PROCEDURE — 83037 HB GLYCOSYLATED A1C HOME DEV: CPT | Performed by: PHYSICIAN ASSISTANT

## 2023-08-21 PROCEDURE — 1036F TOBACCO NON-USER: CPT | Performed by: PHYSICIAN ASSISTANT

## 2023-08-21 PROCEDURE — 3078F DIAST BP <80 MM HG: CPT | Performed by: PHYSICIAN ASSISTANT

## 2023-08-21 RX ORDER — ACETAMINOPHEN 500 MG
TABLET ORAL
Qty: 360 TABLET | Refills: 5 | Status: SHIPPED | OUTPATIENT
Start: 2023-08-21

## 2023-08-21 RX ORDER — MOMETASONE FUROATE 50 UG/1
2 SPRAY, METERED NASAL DAILY
Qty: 1 EACH | Refills: 5 | Status: SHIPPED | OUTPATIENT
Start: 2023-08-21

## 2023-08-21 RX ORDER — AMLODIPINE BESYLATE 10 MG/1
10 TABLET ORAL DAILY
Qty: 90 TABLET | Refills: 1 | Status: SHIPPED | OUTPATIENT
Start: 2023-08-21

## 2023-08-21 RX ORDER — ATORVASTATIN CALCIUM 80 MG/1
TABLET, FILM COATED ORAL
Qty: 90 TABLET | Refills: 3 | Status: SHIPPED | OUTPATIENT
Start: 2023-08-21

## 2023-08-21 SDOH — ECONOMIC STABILITY: FOOD INSECURITY: WITHIN THE PAST 12 MONTHS, YOU WORRIED THAT YOUR FOOD WOULD RUN OUT BEFORE YOU GOT MONEY TO BUY MORE.: NEVER TRUE

## 2023-08-21 SDOH — ECONOMIC STABILITY: INCOME INSECURITY: HOW HARD IS IT FOR YOU TO PAY FOR THE VERY BASICS LIKE FOOD, HOUSING, MEDICAL CARE, AND HEATING?: NOT HARD AT ALL

## 2023-08-21 SDOH — ECONOMIC STABILITY: FOOD INSECURITY: WITHIN THE PAST 12 MONTHS, THE FOOD YOU BOUGHT JUST DIDN'T LAST AND YOU DIDN'T HAVE MONEY TO GET MORE.: NEVER TRUE

## 2023-08-21 SDOH — ECONOMIC STABILITY: HOUSING INSECURITY
IN THE LAST 12 MONTHS, WAS THERE A TIME WHEN YOU DID NOT HAVE A STEADY PLACE TO SLEEP OR SLEPT IN A SHELTER (INCLUDING NOW)?: NO

## 2023-08-21 ASSESSMENT — ENCOUNTER SYMPTOMS
NAUSEA: 0
SHORTNESS OF BREATH: 0
CONSTIPATION: 0
SINUS PRESSURE: 0
SORE THROAT: 0
APNEA: 0
COUGH: 0
DIARRHEA: 0
ABDOMINAL PAIN: 0
VOMITING: 0
COLOR CHANGE: 0
RHINORRHEA: 0
CHEST TIGHTNESS: 0

## 2023-08-21 NOTE — PROGRESS NOTES
[address] PRIMARY CARE  [address] [facility] [address]  Dept: [address] Gladys Cantrell is a 81 y.o. male Established patient, who presents today for his medical conditions/complaints as noted below  Chief Complaint   Patient presents with    Hypertension     Pt is here for a x3 month f/u. Pt has not been checking bps     Diabetes     Pt is here for 3x month f/u. Pt does not check his sugars         HPI:     Hypertension  Associated symptoms include headaches. Pertinent negatives include no chest pain, palpitations or shortness of breath. Diabetes  He presents for his follow-up diabetic visit. He has type 2 diabetes mellitus. His disease course has been stable. Hypoglycemia symptoms include headaches. Pertinent negatives for hypoglycemia include no dizziness, nervousness/anxiousness or seizures. Pertinent negatives for diabetes include no chest pain, no fatigue, no polydipsia, no polyphagia, no polyuria and no weakness. Symptoms are stable. Current diabetic treatment includes oral agent (monotherapy). He is compliant with treatment most of the time. Home blood sugar record trend: does not check sugar readings. An ACE inhibitor/angiotensin II receptor blocker is being taken (and statin).       Reviewed prior notes Orthopedics Had on ankle fracture from a fall--down with boot   Reviewed previous Labs    Hemoglobin A1C (%)   Date Value   05/17/2023 6.1   11/14/2022 5.9   07/21/2022 5.9             ( goal A1C is < 7)   No components found for: LABMICR  LDL Cholesterol (mg/dL)   Date Value   12/16/2020 120   02/01/2016 160 (H)   01/08/2013 129 (H)     LDL Calculated (mg/dL)   Date Value   10/24/2022 128   07/09/2021 122   11/28/2017 122       (goal LDL is <100)   AST (U/L)   Date Value   03/15/2022 25     ALT (U/L)   Date Value   03/15/2022 27     BUN (mg/dL)   Date Value   08/19/2021 15     BP Readings from Last 3 Encounters:   08/21/23 110/64   05/15/23 126/68

## 2023-09-11 RX ORDER — FLASH GLUCOSE SENSOR
KIT MISCELLANEOUS
Qty: 6 EACH | Refills: 3 | Status: SHIPPED | OUTPATIENT
Start: 2023-09-11

## 2023-09-11 NOTE — TELEPHONE ENCOUNTER
LAST VISIT:   8/21/2023     Future Appointments   Date Time Provider [address]   9/25/2023 10:15 AM Fidelina Davila MD [facility]   10/23/2023  1:00 PM Johnnie Collado PA-C [facility]   11/14/2023  2:30 PM Teresa Alston MD [address] [place] [address]

## 2023-10-02 DIAGNOSIS — E55.9 VITAMIN D DEFICIENCY: ICD-10-CM

## 2023-10-02 DIAGNOSIS — D64.9 LOW HEMOGLOBIN: ICD-10-CM

## 2023-10-02 DIAGNOSIS — E03.9 HYPOTHYROIDISM, UNSPECIFIED TYPE: ICD-10-CM

## 2023-10-02 DIAGNOSIS — Z87.81 HISTORY OF VERTEBRAL FRACTURE: ICD-10-CM

## 2023-10-02 LAB
ABSOLUTE IMMATURE GRANULOCYTE: 0.03 K/UL (ref 0–0.3)
BASOPHILS ABSOLUTE: 0.07 K/UL (ref 0–0.2)
BASOPHILS RELATIVE PERCENT: 1 % (ref 0–2)
EOSINOPHILS ABSOLUTE: 0.39 K/UL (ref 0–0.44)
EOSINOPHILS RELATIVE PERCENT: 6 % (ref 1–4)
HCT VFR BLD CALC: 37.4 % (ref 40.7–50.3)
HEMOGLOBIN: 11.7 G/DL (ref 13–17)
IMMATURE GRANULOCYTES: 0 %
LYMPHOCYTES ABSOLUTE: 2.18 K/UL (ref 1.1–3.7)
LYMPHOCYTES RELATIVE PERCENT: 31 % (ref 24–43)
MCH RBC QN AUTO: 29.8 PG (ref 25.2–33.5)
MCHC RBC AUTO-ENTMCNC: 31.3 G/DL (ref 28.4–34.8)
MCV RBC AUTO: 95.4 FL (ref 82.6–102.9)
MONOCYTES ABSOLUTE: 0.63 K/UL (ref 0.1–1.2)
MONOCYTES RELATIVE PERCENT: 9 % (ref 3–12)
NEUTROPHILS ABSOLUTE: 3.84 K/UL (ref 1.5–8.1)
NEUTROPHILS RELATIVE PERCENT: 53 % (ref 36–65)
NRBC AUTOMATED: 0 PER 100 WBC
PDW BLD-RTO: 13.8 % (ref 11.8–14.4)
PLATELET # BLD: 185 K/UL (ref 138–453)
PMV BLD AUTO: 12.3 FL (ref 8.1–13.5)
RBC # BLD: 3.92 M/UL (ref 4.21–5.77)
T4 FREE: 1.1 NG/DL (ref 0.9–1.7)
TSH SERPL DL<=0.05 MIU/L-ACNC: 16.84 UIU/ML (ref 0.3–5)
VITAMIN D 25-HYDROXY: 35.8 NG/ML
WBC # BLD: 7.1 K/UL (ref 3.5–11.3)

## 2023-10-03 DIAGNOSIS — E03.9 HYPOTHYROIDISM, UNSPECIFIED TYPE: ICD-10-CM

## 2023-10-03 DIAGNOSIS — D64.9 LOW HEMOGLOBIN: Primary | ICD-10-CM

## 2023-10-03 RX ORDER — LEVOTHYROXINE SODIUM 137 UG/1
137 TABLET ORAL DAILY
Qty: 30 TABLET | Refills: 2 | Status: SHIPPED | OUTPATIENT
Start: 2023-10-03

## 2023-11-14 ENCOUNTER — TELEPHONE (OUTPATIENT)
Dept: PULMONOLOGY | Age: 83
End: 2023-11-14

## 2023-12-08 ENCOUNTER — TELEPHONE (OUTPATIENT)
Dept: PRIMARY CARE CLINIC | Age: 83
End: 2023-12-08

## 2023-12-08 NOTE — TELEPHONE ENCOUNTER
Pt will call Monday morning if they are keep their visit a virtual or coming in person   Was notified to do questions before visit

## 2023-12-11 ENCOUNTER — OFFICE VISIT (OUTPATIENT)
Dept: PRIMARY CARE CLINIC | Age: 83
End: 2023-12-11
Payer: MEDICARE

## 2023-12-11 VITALS
HEIGHT: 69 IN | DIASTOLIC BLOOD PRESSURE: 80 MMHG | SYSTOLIC BLOOD PRESSURE: 120 MMHG | WEIGHT: 244 LBS | OXYGEN SATURATION: 95 % | BODY MASS INDEX: 36.14 KG/M2 | HEART RATE: 72 BPM

## 2023-12-11 DIAGNOSIS — F32.5 MAJOR DEPRESSIVE DISORDER WITH SINGLE EPISODE, IN FULL REMISSION (HCC): ICD-10-CM

## 2023-12-11 DIAGNOSIS — Z23 NEED FOR VACCINATION: ICD-10-CM

## 2023-12-11 DIAGNOSIS — G47.33 OSA ON CPAP: ICD-10-CM

## 2023-12-11 DIAGNOSIS — D64.9 LOW HEMOGLOBIN: ICD-10-CM

## 2023-12-11 DIAGNOSIS — I10 BENIGN ESSENTIAL HYPERTENSION: ICD-10-CM

## 2023-12-11 DIAGNOSIS — E03.9 HYPOTHYROIDISM, UNSPECIFIED TYPE: ICD-10-CM

## 2023-12-11 DIAGNOSIS — Z00.00 MEDICARE ANNUAL WELLNESS VISIT, SUBSEQUENT: Primary | ICD-10-CM

## 2023-12-11 LAB
ABSOLUTE IMMATURE GRANULOCYTE: <0.03 K/UL (ref 0–0.3)
BASOPHILS ABSOLUTE: 0.09 K/UL (ref 0–0.2)
BASOPHILS RELATIVE PERCENT: 1 % (ref 0–2)
EOSINOPHILS ABSOLUTE: 0.43 K/UL (ref 0–0.44)
EOSINOPHILS RELATIVE PERCENT: 6 % (ref 1–4)
HCT VFR BLD CALC: 40.6 % (ref 40.7–50.3)
HEMOGLOBIN: 12.8 G/DL (ref 13–17)
IMMATURE GRANULOCYTES: 0 %
LYMPHOCYTES ABSOLUTE: 2.38 K/UL (ref 1.1–3.7)
LYMPHOCYTES RELATIVE PERCENT: 32 % (ref 24–43)
MCH RBC QN AUTO: 29.2 PG (ref 25.2–33.5)
MCHC RBC AUTO-ENTMCNC: 31.5 G/DL (ref 28.4–34.8)
MCV RBC AUTO: 92.7 FL (ref 82.6–102.9)
MONOCYTES ABSOLUTE: 0.83 K/UL (ref 0.1–1.2)
MONOCYTES RELATIVE PERCENT: 11 % (ref 3–12)
NEUTROPHILS ABSOLUTE: 3.66 K/UL (ref 1.5–8.1)
NEUTROPHILS RELATIVE PERCENT: 50 % (ref 36–65)
NRBC AUTOMATED: 0 PER 100 WBC
PDW BLD-RTO: 13.6 % (ref 11.8–14.4)
PLATELET # BLD: 197 K/UL (ref 138–453)
PMV BLD AUTO: 12.4 FL (ref 8.1–13.5)
RBC # BLD: 4.38 M/UL (ref 4.21–5.77)
T4 FREE: 1.3 NG/DL (ref 0.9–1.7)
TSH SERPL DL<=0.05 MIU/L-ACNC: 3.87 UIU/ML (ref 0.3–5)
WBC # BLD: 7.4 K/UL (ref 3.5–11.3)

## 2023-12-11 PROCEDURE — G0439 PPPS, SUBSEQ VISIT: HCPCS | Performed by: PHYSICIAN ASSISTANT

## 2023-12-11 PROCEDURE — 1123F ACP DISCUSS/DSCN MKR DOCD: CPT | Performed by: PHYSICIAN ASSISTANT

## 2023-12-11 PROCEDURE — 90694 VACC AIIV4 NO PRSRV 0.5ML IM: CPT | Performed by: PHYSICIAN ASSISTANT

## 2023-12-11 PROCEDURE — 3079F DIAST BP 80-89 MM HG: CPT | Performed by: PHYSICIAN ASSISTANT

## 2023-12-11 PROCEDURE — G8484 FLU IMMUNIZE NO ADMIN: HCPCS | Performed by: PHYSICIAN ASSISTANT

## 2023-12-11 PROCEDURE — 3074F SYST BP LT 130 MM HG: CPT | Performed by: PHYSICIAN ASSISTANT

## 2023-12-11 PROCEDURE — G0008 ADMIN INFLUENZA VIRUS VAC: HCPCS | Performed by: PHYSICIAN ASSISTANT

## 2023-12-11 ASSESSMENT — PATIENT HEALTH QUESTIONNAIRE - PHQ9
6. FEELING BAD ABOUT YOURSELF - OR THAT YOU ARE A FAILURE OR HAVE LET YOURSELF OR YOUR FAMILY DOWN: 0
5. POOR APPETITE OR OVEREATING: 0
SUM OF ALL RESPONSES TO PHQ QUESTIONS 1-9: 0
1. LITTLE INTEREST OR PLEASURE IN DOING THINGS: 0
8. MOVING OR SPEAKING SO SLOWLY THAT OTHER PEOPLE COULD HAVE NOTICED. OR THE OPPOSITE, BEING SO FIGETY OR RESTLESS THAT YOU HAVE BEEN MOVING AROUND A LOT MORE THAN USUAL: 0
3. TROUBLE FALLING OR STAYING ASLEEP: 0
9. THOUGHTS THAT YOU WOULD BE BETTER OFF DEAD, OR OF HURTING YOURSELF: 0
SUM OF ALL RESPONSES TO PHQ QUESTIONS 1-9: 0
10. IF YOU CHECKED OFF ANY PROBLEMS, HOW DIFFICULT HAVE THESE PROBLEMS MADE IT FOR YOU TO DO YOUR WORK, TAKE CARE OF THINGS AT HOME, OR GET ALONG WITH OTHER PEOPLE: 0
4. FEELING TIRED OR HAVING LITTLE ENERGY: 0
2. FEELING DOWN, DEPRESSED OR HOPELESS: 0
SUM OF ALL RESPONSES TO PHQ9 QUESTIONS 1 & 2: 0
7. TROUBLE CONCENTRATING ON THINGS, SUCH AS READING THE NEWSPAPER OR WATCHING TELEVISION: 0

## 2023-12-11 ASSESSMENT — LIFESTYLE VARIABLES
HOW MANY STANDARD DRINKS CONTAINING ALCOHOL DO YOU HAVE ON A TYPICAL DAY: PATIENT DOES NOT DRINK
HOW OFTEN DO YOU HAVE A DRINK CONTAINING ALCOHOL: NEVER

## 2023-12-11 ASSESSMENT — COLUMBIA-SUICIDE SEVERITY RATING SCALE - C-SSRS
4. HAVE YOU HAD THESE THOUGHTS AND HAD SOME INTENTION OF ACTING ON THEM?: NO
7. DID THIS OCCUR IN THE LAST THREE MONTHS: NO
3. HAVE YOU BEEN THINKING ABOUT HOW YOU MIGHT KILL YOURSELF?: NO
5. HAVE YOU STARTED TO WORK OUT OR WORKED OUT THE DETAILS OF HOW TO KILL YOURSELF? DO YOU INTEND TO CARRY OUT THIS PLAN?: NO

## 2023-12-11 NOTE — PROGRESS NOTES
Medicare Annual Wellness Visit    Jose Ferguson is here for Medicare AWV (No concerns /Did not get Dexa scan /)    Assessment & Plan   Medicare annual wellness visit, subsequent  Need for vaccination  -     Influenza, FLUAD, (age 72 y+), IM, Preservative Free, 0.5 mL  Benign essential hypertension  ANTONIO on CPAP  Hypothyroidism, unspecified type    Recommendations for Preventive Services Due: see orders and patient instructions/AVS.  Recommended screening schedule for the next 5-10 years is provided to the patient in written form: see Patient Instructions/AVS.     Return in about 3 months (around 3/11/2024) for HTN and DM. Subjective   The following acute and/or chronic problems were also addressed today:    BW today   Heat, lumbar pillow, and Tylenol for low back pain  Paperwork for DPOA  Flu vax today  Check CPAP machine for losing air and if nothing found than see CPAP DME company  NO change in meds. Patient's complete Health Risk Assessment and screening values have been reviewed and are found in Flowsheets. The following problems were reviewed today and where indicated follow up appointments were made and/or referrals ordered.     Positive Risk Factor Screenings with Interventions:    Fall Risk:  Do you feel unsteady or are you worried about falling? : no  2 or more falls in past year?: (!) yes  Fall with injury in past year?: no     Interventions:    Patient declines any further evaluation or treatment Has done PT and has a cane to use            General HRA Questions:  Select all that apply: (!) New or Increased Pain    Pain Interventions:  Patient declined any further interventions or treatmentLow back pain uses Tylenol         Weight and Activity:  Physical Activity: Sufficiently Active (12/11/2023)    Exercise Vital Sign     Days of Exercise per Week: 7 days     Minutes of Exercise per Session: 60 min     On average, how many days per week do you engage in moderate to strenuous exercise (like a

## 2023-12-11 NOTE — PATIENT INSTRUCTIONS

## 2023-12-11 NOTE — PROGRESS NOTES
"  Physical Therapy  Physical Therapy Treatment Note    Patient Name: Mathew Ulrich  MRN: [id number]  Today's Date: 12/8/2023  Time Calculation  Start Time: 1240  Stop Time: 1335  Time Calculation (min): 55 min    Insurance:  Visit number: 22 of 30  Authorization info: auth after 30 visits  Insurance Type: [facility]    General:  Reason for visit: s/p achilles repair  Referred by: Dr. Mackay    Assessment:  Pt had best session to date. Pt denied increase in pain with pulsometrics. Pt continues to have functional weakness with jumping with dynamic knee valgus. Pt would continue to benefit from further therapy to reach all established goals         Plan:    Continue tibialis anterior strengthening. Progress gait and plyos as tolerated. STM prn.     Current Problem  1. Chronic pain of left ankle        2. Rupture of left Achilles tendon, subsequent encounter        3. Stiffness of left ankle, not elsewhere classified        4. Achilles rupture, left, subsequent encounter        5. Acute left ankle pain        6. Left ankle pain        7. Difficulty walking                  Precautions   Post-op achilles protocol      Subjective:   Pt arrived to therapy after a long hiatus secondary to death in the family. Pt states he is feeling well overall. Pt went bowling with no issues      Pain  Pain Assessment: 0-10  Pain Score: 0 - No pain    Objective:   Objective   Ankle AROM  Dorsiflexion R 20 L 20  Plantarflexion R 40 L 40    No tenderness to palpation over achilles tendon. Palpable trigger point in lateral gastroc muscle belly      Performing HEP?: Yes        Treatment Performed:    Neuromuscular Re Education:    25min    Fwd single leg hops 3x20  Band assisted squat jumps in squat rack 3x20  Box Jumps 6\" box 3x10      Therapeutic Exercise:    30min  Upright bike warm up x 5'  Gastroc and soleus stretch 3x30\" each  Heel raise eccentrics 3x10  Basketball shooting      Ori Shin PT   " [facility]  [facility]   [address]  [address]  Dept: [phone]  Dept Fax: [phone]      9/2/21    Patient: Sandy Burr  YOB: 1940    Dear Donna Gardner PA-C,    I had the pleasure of seeing one of your patients, Sirena Grijalva today in the office today. Please find attached my note with the assessment and plan of care. Thank you for allowing me to participate in the care of this patient. I will keep you updated on this patient's follow up and I look forward to serving you and your patients again in the future.     Margy Mandel MD  9/2/2021 3:27 PM

## 2024-01-22 DIAGNOSIS — E03.9 HYPOTHYROIDISM, UNSPECIFIED TYPE: ICD-10-CM

## 2024-01-23 RX ORDER — LEVOTHYROXINE SODIUM 137 UG/1
137 TABLET ORAL DAILY
Qty: 90 TABLET | Refills: 3 | Status: SHIPPED | OUTPATIENT
Start: 2024-01-23

## 2024-02-27 ENCOUNTER — TELEPHONE (OUTPATIENT)
Dept: PULMONOLOGY | Age: 84
End: 2024-02-27

## 2024-02-27 DIAGNOSIS — J44.9 CHRONIC OBSTRUCTIVE PULMONARY DISEASE, UNSPECIFIED COPD TYPE (HCC): Primary | ICD-10-CM

## 2024-02-27 RX ORDER — BUDESONIDE 0.5 MG/2ML
500 INHALANT ORAL 2 TIMES DAILY
Qty: 60 EACH | Refills: 3 | Status: SHIPPED | OUTPATIENT
Start: 2024-02-27

## 2024-02-27 RX ORDER — ALBUTEROL SULFATE 2.5 MG/3ML
2.5 SOLUTION RESPIRATORY (INHALATION) 4 TIMES DAILY
Qty: 360 ML | Refills: 3 | Status: SHIPPED | OUTPATIENT
Start: 2024-02-27 | End: 2024-03-28

## 2024-02-27 NOTE — TELEPHONE ENCOUNTER
Patient is sick and so is the rest of the house. They had to cancel their appt with you for tomorrow and are now scheduled into May.     They needs refills for the Budesonide and Albuterol for the nebulizer.     Please sign the scripts if you agree.

## 2024-03-01 NOTE — TELEPHONE ENCOUNTER
Called noni left message that we need another pharmacy to send his scripts to as [facility] e[facility]scribe is down.

## 2024-03-25 DIAGNOSIS — N13.8 BPH WITH OBSTRUCTION/LOWER URINARY TRACT SYMPTOMS: ICD-10-CM

## 2024-03-25 DIAGNOSIS — N40.1 BPH WITH OBSTRUCTION/LOWER URINARY TRACT SYMPTOMS: ICD-10-CM

## 2024-03-25 RX ORDER — TAMSULOSIN HYDROCHLORIDE 0.4 MG/1
CAPSULE ORAL
Qty: 90 CAPSULE | Refills: 3 | Status: SHIPPED | OUTPATIENT
Start: 2024-03-25

## 2024-04-04 NOTE — TELEPHONE ENCOUNTER
LAST VISIT: 5/8/23 with EJ Turner  NEXT VISIT: 5/28/24    Per EJ Turner's last dictation patient is on this medication. Please sign for refill if ok. Thank you.

## 2024-04-05 RX ORDER — BUDESONIDE 0.5 MG/2ML
INHALANT ORAL
Qty: 120 ML | Refills: 2 | Status: SHIPPED | OUTPATIENT
Start: 2024-04-05

## 2024-05-28 ENCOUNTER — OFFICE VISIT (OUTPATIENT)
Dept: PULMONOLOGY | Age: 84
End: 2024-05-28
Payer: MEDICARE

## 2024-05-28 VITALS
SYSTOLIC BLOOD PRESSURE: 136 MMHG | HEART RATE: 68 BPM | OXYGEN SATURATION: 97 % | HEIGHT: 69 IN | BODY MASS INDEX: 35.84 KG/M2 | WEIGHT: 242 LBS | DIASTOLIC BLOOD PRESSURE: 82 MMHG | RESPIRATION RATE: 17 BRPM

## 2024-05-28 DIAGNOSIS — J44.9 CHRONIC OBSTRUCTIVE PULMONARY DISEASE, UNSPECIFIED COPD TYPE (HCC): Primary | ICD-10-CM

## 2024-05-28 DIAGNOSIS — I25.10 CORONARY ARTERY DISEASE INVOLVING NATIVE CORONARY ARTERY OF NATIVE HEART WITHOUT ANGINA PECTORIS: ICD-10-CM

## 2024-05-28 DIAGNOSIS — I50.32 CHRONIC DIASTOLIC (CONGESTIVE) HEART FAILURE (HCC): ICD-10-CM

## 2024-05-28 DIAGNOSIS — E66.01 SEVERE OBESITY (BMI 35.0-39.9) WITH COMORBIDITY (HCC): ICD-10-CM

## 2024-05-28 DIAGNOSIS — J45.30 MILD PERSISTENT ASTHMA WITHOUT COMPLICATION: ICD-10-CM

## 2024-05-28 DIAGNOSIS — G47.33 OBSTRUCTIVE SLEEP APNEA SYNDROME: ICD-10-CM

## 2024-05-28 PROCEDURE — G8427 DOCREV CUR MEDS BY ELIG CLIN: HCPCS | Performed by: INTERNAL MEDICINE

## 2024-05-28 PROCEDURE — 99214 OFFICE O/P EST MOD 30 MIN: CPT | Performed by: INTERNAL MEDICINE

## 2024-05-28 PROCEDURE — 3075F SYST BP GE 130 - 139MM HG: CPT | Performed by: INTERNAL MEDICINE

## 2024-05-28 PROCEDURE — G8417 CALC BMI ABV UP PARAM F/U: HCPCS | Performed by: INTERNAL MEDICINE

## 2024-05-28 PROCEDURE — 1123F ACP DISCUSS/DSCN MKR DOCD: CPT | Performed by: INTERNAL MEDICINE

## 2024-05-28 PROCEDURE — 1036F TOBACCO NON-USER: CPT | Performed by: INTERNAL MEDICINE

## 2024-05-28 PROCEDURE — 3023F SPIROM DOC REV: CPT | Performed by: INTERNAL MEDICINE

## 2024-05-28 PROCEDURE — 3079F DIAST BP 80-89 MM HG: CPT | Performed by: INTERNAL MEDICINE

## 2024-05-28 NOTE — PROGRESS NOTES
who is alert, oriented, cooperative and in no apparent distress.    Head:normocephalic and atraumatic.    EENT:  ELENI.  No conjunctival injections.  Septum was midline, mucosa was without erythema, exudates or cobblestoning.  No thrush was noted.  MallampatiIII (soft palate, base of uvula visible)  Neck: Supple without thyromegaly. No elevated JVP. Trachea was midline.  Respiratory: Chest was symmetrical without dullness to percussion.  Breath sounds bilaterally were clear to auscultation. There were no wheezes, rhonchi or rales. There is no intercostal retraction or use of accessory muscles  Cardiovascular: Regular without murmur, clicks, gallops or rubs.    Abdomen: Slightly rounded and soft without organomegaly. No rebound, rigidity or guarding was appreciated.    Lymphatic: No lymphadenopathy.  Extremities:  does not have lower extremity edema,  no ulcerations, tenderness, varicosities or erythema.  No involuntary movements are noted.   Skin:  Warm and dry.  Good color, turgor and pigmentation. No lesions or scars.  No cyanosis or clubbing  Neurological/Psychiatric: The patient's general behavior, level of consciousness, thought content and emotional status is normal.          DATA:     Pulmonary function tests: none and were ordered        CXR: REVIEWED: In July 2021 patient had a film at the [facility] that was without any acute problems    CT scan of the chest in 2018 without any acute abnormalities    PFTs were done on December 2 of 2021 and they showed extrapulmonic restrictive ventilatory defect      IMPRESSION:   1. Chronic obstructive pulmonary disease, unspecified COPD type (HCC)    2. Severe obesity (BMI 35.0-39.9) with comorbidity ([facility])    3. Obstructive sleep apnea syndrome    4. Mild persistent asthma without complication    5. Chronic diastolic (congestive) heart failure (HCC)    6. Coronary artery disease involving native coronary artery of native heart without angina pectoris

## 2024-06-24 ENCOUNTER — TELEPHONE (OUTPATIENT)
Dept: PRIMARY CARE CLINIC | Age: 84
End: 2024-06-24

## 2024-06-24 NOTE — TELEPHONE ENCOUNTER
Appt made  ----- Message from Ryan Butler sent at 6/24/2024  9:56 AM EDT -----  Regarding: ECC Appointment Request  ECC Appointment Request    Patient needs appointment for ECC Appointment Type: Annual Visit.    Reason for Appointment Request: No appointments available during search  Additional info: Preferred 3pm this day but anything can do according to the caller (tala)  --------------------------------------------------------------------------------------------------------------------------    Relationship to Patient: Other Father In law     Call Back Information: OK to leave message on voicemail  Preferred Call Back Number: Phone [phone]

## 2024-07-08 ASSESSMENT — ENCOUNTER SYMPTOMS
DIARRHEA: 0
RHINORRHEA: 0
COLOR CHANGE: 0
SINUS PRESSURE: 0
NAUSEA: 0
ABDOMINAL PAIN: 0
CONSTIPATION: 0
VOMITING: 0
COUGH: 0
SHORTNESS OF BREATH: 0
SORE THROAT: 0
CHEST TIGHTNESS: 0

## 2024-07-09 ENCOUNTER — OFFICE VISIT (OUTPATIENT)
Dept: PRIMARY CARE CLINIC | Age: 84
End: 2024-07-09
Payer: MEDICARE

## 2024-07-09 VITALS
BODY MASS INDEX: 35.58 KG/M2 | HEIGHT: 69 IN | OXYGEN SATURATION: 90 % | HEART RATE: 68 BPM | DIASTOLIC BLOOD PRESSURE: 76 MMHG | SYSTOLIC BLOOD PRESSURE: 118 MMHG | WEIGHT: 240.2 LBS

## 2024-07-09 DIAGNOSIS — E11.9 TYPE 2 DIABETES MELLITUS WITHOUT COMPLICATION, WITHOUT LONG-TERM CURRENT USE OF INSULIN (HCC): Primary | ICD-10-CM

## 2024-07-09 DIAGNOSIS — I10 BENIGN ESSENTIAL HYPERTENSION: ICD-10-CM

## 2024-07-09 DIAGNOSIS — J44.9 CHRONIC OBSTRUCTIVE PULMONARY DISEASE, UNSPECIFIED COPD TYPE (HCC): ICD-10-CM

## 2024-07-09 DIAGNOSIS — E11.40 TYPE 2 DIABETES MELLITUS WITH DIABETIC NEUROPATHY, WITHOUT LONG-TERM CURRENT USE OF INSULIN (HCC): ICD-10-CM

## 2024-07-09 DIAGNOSIS — G62.9 NEUROPATHY: ICD-10-CM

## 2024-07-09 DIAGNOSIS — I25.10 CORONARY ARTERY DISEASE INVOLVING NATIVE CORONARY ARTERY OF NATIVE HEART WITHOUT ANGINA PECTORIS: ICD-10-CM

## 2024-07-09 DIAGNOSIS — R26.89 BALANCE DISORDER: ICD-10-CM

## 2024-07-09 DIAGNOSIS — I50.32 CHRONIC DIASTOLIC (CONGESTIVE) HEART FAILURE (HCC): ICD-10-CM

## 2024-07-09 LAB
CREATININE URINE POCT: 100
HBA1C MFR BLD: 6.3 %
MICROALBUMIN/CREAT 24H UR: 30 MG/DL
MICROALBUMIN/CREAT UR-RTO: <30 MG/G

## 2024-07-09 PROCEDURE — 3074F SYST BP LT 130 MM HG: CPT | Performed by: PHYSICIAN ASSISTANT

## 2024-07-09 PROCEDURE — 82044 UR ALBUMIN SEMIQUANTITATIVE: CPT | Performed by: PHYSICIAN ASSISTANT

## 2024-07-09 PROCEDURE — 83036 HEMOGLOBIN GLYCOSYLATED A1C: CPT | Performed by: PHYSICIAN ASSISTANT

## 2024-07-09 PROCEDURE — 99215 OFFICE O/P EST HI 40 MIN: CPT | Performed by: PHYSICIAN ASSISTANT

## 2024-07-09 PROCEDURE — G8427 DOCREV CUR MEDS BY ELIG CLIN: HCPCS | Performed by: PHYSICIAN ASSISTANT

## 2024-07-09 PROCEDURE — 3078F DIAST BP <80 MM HG: CPT | Performed by: PHYSICIAN ASSISTANT

## 2024-07-09 PROCEDURE — G8417 CALC BMI ABV UP PARAM F/U: HCPCS | Performed by: PHYSICIAN ASSISTANT

## 2024-07-09 PROCEDURE — 1123F ACP DISCUSS/DSCN MKR DOCD: CPT | Performed by: PHYSICIAN ASSISTANT

## 2024-07-09 PROCEDURE — 1036F TOBACCO NON-USER: CPT | Performed by: PHYSICIAN ASSISTANT

## 2024-07-09 PROCEDURE — 3023F SPIROM DOC REV: CPT | Performed by: PHYSICIAN ASSISTANT

## 2024-07-09 ASSESSMENT — PATIENT HEALTH QUESTIONNAIRE - PHQ9
3. TROUBLE FALLING OR STAYING ASLEEP: NOT AT ALL
9. THOUGHTS THAT YOU WOULD BE BETTER OFF DEAD, OR OF HURTING YOURSELF: NOT AT ALL
4. FEELING TIRED OR HAVING LITTLE ENERGY: NOT AT ALL
SUM OF ALL RESPONSES TO PHQ QUESTIONS 1-9: 0
7. TROUBLE CONCENTRATING ON THINGS, SUCH AS READING THE NEWSPAPER OR WATCHING TELEVISION: NOT AT ALL
6. FEELING BAD ABOUT YOURSELF - OR THAT YOU ARE A FAILURE OR HAVE LET YOURSELF OR YOUR FAMILY DOWN: NOT AT ALL
10. IF YOU CHECKED OFF ANY PROBLEMS, HOW DIFFICULT HAVE THESE PROBLEMS MADE IT FOR YOU TO DO YOUR WORK, TAKE CARE OF THINGS AT HOME, OR GET ALONG WITH OTHER PEOPLE: NOT DIFFICULT AT ALL
SUM OF ALL RESPONSES TO PHQ QUESTIONS 1-9: 0
2. FEELING DOWN, DEPRESSED OR HOPELESS: NOT AT ALL
8. MOVING OR SPEAKING SO SLOWLY THAT OTHER PEOPLE COULD HAVE NOTICED. OR THE OPPOSITE, BEING SO FIGETY OR RESTLESS THAT YOU HAVE BEEN MOVING AROUND A LOT MORE THAN USUAL: NOT AT ALL
SUM OF ALL RESPONSES TO PHQ9 QUESTIONS 1 & 2: 0
SUM OF ALL RESPONSES TO PHQ QUESTIONS 1-9: 0
1. LITTLE INTEREST OR PLEASURE IN DOING THINGS: NOT AT ALL
5. POOR APPETITE OR OVEREATING: NOT AT ALL
SUM OF ALL RESPONSES TO PHQ QUESTIONS 1-9: 0

## 2024-07-09 NOTE — PROGRESS NOTES
[facility]  [facility]  [address]  [place] [address]  Dept: [phone]    John Burgess is a 84 y.o. male who presents today for his medical conditions/complaints as noted below.      Chief Complaint   Patient presents with    Diabetes     Patient is here for Dm f/u - patient paula not check sugars at home - cpap machine is working well        HPI:     HPI  Sugars have been running well.    No hypoglycemia  Is taking ARB and statin  Saw  as new cardiologist--reviewed 3/2024 note  Reviewed pulmo note--pulmo rehab?   BiPAP is working well  No problems with feet  No components found for: \"LDLCHOLESTEROL\", \"LDLCALC\"    (goal LDL is <100)   AST (U/L)   Date Value   10/02/2023 24     ALT (U/L)   Date Value   10/02/2023 20     BUN (mg/dL)   Date Value   08/19/2021 15     BP Readings from Last 3 Encounters:   07/09/24 118/76   05/28/24 136/82   03/25/24 120/64          (goal 120/80)    Past Medical History:   Diagnosis Date    Depression     Hyperlipidemia     Hypertension     Hypothyroidism       Past Surgical History:   Procedure Laterality Date    CARDIAC CATHETERIZATION  09/23/2021    CORONARY ANGIOPLASTY WITH STENT PLACEMENT  2013    EYE SURGERY      as a child       Family History   Problem Relation Age of Onset    Cancer Mother         breast    Diabetes Brother     Heart Disease Brother     High Blood Pressure Brother     Diabetes Maternal Uncle     Heart Disease Maternal Uncle     Cancer Other         throat       Social History     Tobacco Use    Smoking status: Never     Passive exposure: Yes    Smokeless tobacco: Never   Substance Use Topics    Alcohol use: No      Current Outpatient Medications   Medication Sig Dispense Refill    budesonide (PULMICORT) 0.5 MG/2ML nebulizer suspension INHALE 1 VIAL VIA NEBULIZER TWO TIMES A  mL 2    tamsulosin (FLOMAX) 0.4 MG capsule TAKE 1 CAPSULE BY MOUTH IN THE EVENING 90 capsule 3    sertraline (ZOLOFT) 50

## 2024-07-12 ENCOUNTER — CARE COORDINATION (OUTPATIENT)
Dept: CARE COORDINATION | Age: 84
End: 2024-07-12

## 2024-07-12 NOTE — CARE COORDINATION
Ambulatory Care Coordination Note     2024 11:01 AM     Patient Current Location:  [place]     This patient was received as a referral from Provider.    ACM contacted the family, son in law Vishnu  by telephone. Verified name and  with family as identifiers. Provided introduction to self, and explanation of the ACM role.            ACM: Milana Machuca RN     Challenges to be reviewed by the provider   Additional needs identified to be addressed with provider No  none               Method of communication with provider: none.    Care Summary Note: Left VM message on daughter Jenn's phone asking for call back. Called other phone which belongs to son in law Vishnu and spoke with him.  John lives with Jenn and Vishnu, Vishnu manages his medications and takes him to appts. He stated he feels everything is going well right now, not sure if care management calls are needed but will speak with patient and let writer know if he agrees with calls. Patient does not have own phone number listed as he gets confused with phone calls.  Patient has DM, COPD, CHF, ANTONIO, CAD with stent, HTN. DM controlled with A1c of 6.3, he wears bipap at HS and follows with Dr. Edmond, follows with Dr. Luo, cardiologist.  PCP referred him to podiatrist at last appt to get diabetic shoes ordered, he does have some neuropathy and balance issues with occasional falls.    Writer gave contact information to Vishnu to call writer back if interested in care management or for needs or concerns. Will call again in a few weeks if no return call.    PCP/Specialist follow up:   Future Appointments         Provider Specialty Dept Phone    10/7/2024 10:15 AM Anita Luo MD Cardiology [phone]    2024 3:15 PM Conner Edmond MD Pulmonology [phone]    2025 3:30 PM Martha Apodaca PA-C Primary Care [phone]

## 2024-07-31 ENCOUNTER — CARE COORDINATION (OUTPATIENT)
Dept: CARE COORDINATION | Age: 84
End: 2024-07-31

## 2024-07-31 NOTE — CARE COORDINATION
Ambulatory Care Coordination Note     7/31/2024 9:38 AM     Family, daughter Jenn  outreach attempt by this ACM today to offer care management services. [facility] was unable to reach the family, daughter Jenn  by telephone today; left voice message requesting a return phone call to this ACM.     ACM: Milana Machuca RN          PCP/Specialist follow up:   Future Appointments         Provider Specialty Dept Phone    8/26/2024 4:00 PM Reyes Su DPM Podiatry [phone]    10/7/2024 10:15 AM Anita Luo MD Cardiology [phone]    11/26/2024 3:15 PM Conner Edmond MD Pulmonology [phone]    1/13/2025 3:30 PM Martha Apodaca PA-C Primary Care [phone]            Follow Up:   Plan for next AC outreach in approximately 1 week to complete:  - outreach attempt to offer care management services.

## 2024-08-07 ENCOUNTER — CARE COORDINATION (OUTPATIENT)
Dept: CARE COORDINATION | Age: 84
End: 2024-08-07

## 2024-08-07 NOTE — CARE COORDINATION
Ambulatory Care Coordination Note     8/7/2024 11:05 AM     Patient outreach attempt by this ACM today to offer care management services. ACM was unable to reach the patient by telephone today; left voice message requesting a return phone call to this ACM.  letter mailed requesting patient to contact this ACM.      Patient closed (unable to reach patient) from the High Risk Care Management program on 8/7/2024.This is third attempt to contact him.    Future Appointments   Date Time Provider Department Center   [facility]26/2024  4:00 PM [facility]   10/7/2024 10:15 AM Anita Luo MD [facility]   11/26/2024  3:15 [facility]   1/13/2025  3:30 PM Martha Apodaca PA-C [facility] DEP

## 2024-08-26 ENCOUNTER — OFFICE VISIT (OUTPATIENT)
Dept: PODIATRY | Age: 84
End: 2024-08-26
Payer: MEDICARE

## 2024-08-26 VITALS — WEIGHT: 242 LBS | BODY MASS INDEX: 37.98 KG/M2 | HEIGHT: 67 IN

## 2024-08-26 DIAGNOSIS — M20.41 HAMMER TOES OF BOTH FEET: ICD-10-CM

## 2024-08-26 DIAGNOSIS — L84 CORNS AND CALLOSITIES: ICD-10-CM

## 2024-08-26 DIAGNOSIS — M79.672 PAIN IN LEFT FOOT: ICD-10-CM

## 2024-08-26 DIAGNOSIS — M79.675 PAIN OF TOES OF BOTH FEET: ICD-10-CM

## 2024-08-26 DIAGNOSIS — M20.42 HAMMER TOES OF BOTH FEET: ICD-10-CM

## 2024-08-26 DIAGNOSIS — M79.674 PAIN OF TOES OF BOTH FEET: ICD-10-CM

## 2024-08-26 DIAGNOSIS — B35.1 ONYCHOMYCOSIS OF TOENAIL: Primary | ICD-10-CM

## 2024-08-26 DIAGNOSIS — E11.42 DIABETIC POLYNEUROPATHY ASSOCIATED WITH TYPE 2 DIABETES MELLITUS (HCC): ICD-10-CM

## 2024-08-26 PROCEDURE — 1036F TOBACCO NON-USER: CPT | Performed by: PODIATRIST

## 2024-08-26 PROCEDURE — G8417 CALC BMI ABV UP PARAM F/U: HCPCS | Performed by: PODIATRIST

## 2024-08-26 PROCEDURE — 3044F HG A1C LEVEL LT 7.0%: CPT | Performed by: PODIATRIST

## 2024-08-26 PROCEDURE — G8427 DOCREV CUR MEDS BY ELIG CLIN: HCPCS | Performed by: PODIATRIST

## 2024-08-26 PROCEDURE — 99203 OFFICE O/P NEW LOW 30 MIN: CPT | Performed by: PODIATRIST

## 2024-08-26 PROCEDURE — 1123F ACP DISCUSS/DSCN MKR DOCD: CPT | Performed by: PODIATRIST

## 2024-08-26 PROCEDURE — 11055 PARING/CUTG B9 HYPRKER LES 1: CPT | Performed by: PODIATRIST

## 2024-08-26 ASSESSMENT — ENCOUNTER SYMPTOMS
DIARRHEA: 0
BACK PAIN: 0
COLOR CHANGE: 0
NAUSEA: 0
SHORTNESS OF BREATH: 0

## 2024-08-26 NOTE — PROGRESS NOTES
John Burgess is a 84 y.o. male who presents to the office today with chief complaint of request for a diabetic foot exam.  Chief Complaint   Patient presents with    Diabetes     Last A1C 6.3  Diabetic foot check    Symptoms began about 4 year(s) ago. Patient denies injury to the feet. Patient states that he was diagnosed with mild neuropathy by his PCP, but he does not take any medication for this. Patient also denies any burning, numbness, or tingling to the feet. Pain is rated 0 out of 10 at it's worst and is described as none. Treatments prior to today's visit include: None.      Allergies   Allergen Reactions    Amiodarone Other (See Comments)     hallucinations       Past Medical History:   Diagnosis Date    Depression     Hyperlipidemia     Hypertension     Hypothyroidism        Prior to Admission medications    Medication Sig Start Date End Date Taking? Authorizing Provider   sertraline (ZOLOFT) 50 MG tablet TAKE 1 TABLET BY MOUTH EVERY DAY 7/11/24  Yes Martha Apodaca PA-C   budesonide (PULMICORT) 0.5 MG/2ML nebulizer suspension INHALE 1 VIAL VIA NEBULIZER TWO TIMES A DAY 4/5/24  Yes Celso Evangelista MD   tamsulosin (FLOMAX) 0.4 MG capsule TAKE 1 CAPSULE BY MOUTH IN THE EVENING 3/25/24  Yes Martha Apodaca PA-C   amLODIPine (NORVASC) 10 MG tablet Take 1 tablet by mouth daily 3/25/24  Yes Anita Luo MD   atorvastatin (LIPITOR) 80 MG tablet TAKE 1 TABLET BY MOUTH NIGHTLY 3/25/24  Yes Anita Luo MD   carvedilol (COREG) 6.25 MG tablet Take 1 tablet by mouth 2 times daily 3/25/24  Yes Anita Luo MD   clopidogrel (PLAVIX) 75 MG tablet Take 1 tablet by mouth daily 3/25/24  Yes Anita Luo MD   losartan (COZAAR) 100 MG tablet Take 1 tablet by mouth 2 times daily 3/25/24  Yes Anita Luo MD   levothyroxine (SYNTHROID) 137 MCG tablet TAKE 1 TABLET BY MOUTH EVERY DAY 1/23/24  Yes Martha Apodaca PA-C   aspirin 81 MG chewable tablet Take 1 tablet by mouth daily 9/25/23       Vascular: DP pulse of the right foot is  palpable.     DP pulse of the left foot is  palpable.     PT pulse of the right foot is  palpable.     PT pulse of the left foot is  palpable.     CFT is less than 3 secs to the digits of the right foot.     CFT is less than 3 secs to the digits of the left foot.     There is no edema noted to the bilateral foot or ankle.     There is hair growth noted to the digits of the bilateral feet.     There are no varicosities noted to the right foot/ankle.     There are no varicosities noted to the left foot/ankle.     Erythema is absent to the bilateral feet.    Neurological: Reflexes are present to the right plantar foot and to the Achilles tendon.     Reflexes are present to the left plantar foot and to the Achilles tendon.     Protective sensation is absent to the right plantar foot as noted with a 5.07 [place] monofilament.     Protective sensation is absent to the left plantar foot as noted with a 5.07 [place] monofilament.    Musculoskeletal:  Muscle strength is +5/5 to all four muscle groups of the right lower extremity and +5/5 to all four muscle groups of the left lower extremity.     There are no areas of subluxation, dislocation, or laxity noted to either lower extremity.     Range of motion to the right ankle is  free of pain or grinding.     Range of motion to the left ankle is  free of pain or grinding.     Range of motion to the right subtalar joint is  free of pain or grinding.     Range of motion to the left subtalar joint is  free of pain or grinding.     No abnormalities, asymmetries, or misalignments are seen between the extremities.    Weightbearing evaluation does reveal rearfoot eversion, medial prominence of the talar head, loss of the medial longitudinal arch height, and too many toes sign bilaterally.    The lesser digits of the right foot are contracted.     The lesser digits of the left foot are contracted.     There is no prominence

## 2024-09-25 DIAGNOSIS — E11.9 TYPE 2 DIABETES MELLITUS WITHOUT COMPLICATION, WITHOUT LONG-TERM CURRENT USE OF INSULIN (HCC): ICD-10-CM

## 2024-10-09 ENCOUNTER — TELEPHONE (OUTPATIENT)
Dept: PRIMARY CARE CLINIC | Age: 84
End: 2024-10-09

## 2024-10-09 NOTE — TELEPHONE ENCOUNTER
Patient needs scheduled with an MD or DO to complete a diabetic foot exam. LVM on patients daughters phone asking for a call back.    [facility] called requesting documentation for patients diabetic shoes. They need the encounter notes and DME orders. This needs to be completed by an MD or DO. See below for medicare requirements regarding diabetic shoes.    [facility] phone: [phone], fax: [phone]      Diabetic Shoe Requirements  For Medicare, Medicare Advantage and Traditional Medicaid    The encounter must be completed by an MD or DO who is treating the patient for Diabetes. It must state in the encounter that the following items were completed by an MD or DO.    Patient is under a comprehensive care plan for diabetes.  Diabetic foot exam was completed by an MD or DO  Documentation of foot condition which requires the diabetic shoes, inserts or custom inserts, including diagnosis codes (MUST HAVE ONE OR MORE OF THE FOLLOWING CONDITIONS)  Partial or complete foot amputation  History of foot ulcers  Calluses of foot that can lead to ulcers  Nerve damage with signs of calluses in foot  Deformity (e.g.: hammertoe or bunion)  Poor circulation in foot  The encounter must state that diabetic shoes, inserts or custom inserts are medically necessary as part of the patient's diabetic plan of care.

## 2024-10-10 NOTE — TELEPHONE ENCOUNTER
Foot exam done 07/09/24 with Sofy Apodaca - was referred to Podiatry - has appointment with podiatry 10/21/24-

## 2024-10-17 NOTE — TELEPHONE ENCOUNTER
Per [facility] since exam was not completed by an MD or  and completed by a podiatrist, it will be accepted by insurance if an MD or DO signs the bottom of the podiatrist exam notes, the detailed orders, the certification of medical necessity and the order form that is being faxed to our office.    All items must be signed by and MD or .

## 2024-11-26 ENCOUNTER — OFFICE VISIT (OUTPATIENT)
Dept: PULMONOLOGY | Age: 84
End: 2024-11-26
Payer: MEDICARE

## 2024-11-26 VITALS
DIASTOLIC BLOOD PRESSURE: 86 MMHG | RESPIRATION RATE: 16 BRPM | SYSTOLIC BLOOD PRESSURE: 154 MMHG | HEIGHT: 68 IN | OXYGEN SATURATION: 91 % | BODY MASS INDEX: 36.68 KG/M2 | HEART RATE: 78 BPM | WEIGHT: 242 LBS

## 2024-11-26 DIAGNOSIS — J44.9 CHRONIC OBSTRUCTIVE PULMONARY DISEASE, UNSPECIFIED COPD TYPE (HCC): Primary | ICD-10-CM

## 2024-11-26 DIAGNOSIS — J45.30 MILD PERSISTENT ASTHMA WITHOUT COMPLICATION: ICD-10-CM

## 2024-11-26 DIAGNOSIS — I50.32 CHRONIC DIASTOLIC (CONGESTIVE) HEART FAILURE (HCC): ICD-10-CM

## 2024-11-26 DIAGNOSIS — I25.10 CORONARY ARTERY DISEASE INVOLVING NATIVE CORONARY ARTERY OF NATIVE HEART WITHOUT ANGINA PECTORIS: ICD-10-CM

## 2024-11-26 DIAGNOSIS — G47.33 OBSTRUCTIVE SLEEP APNEA SYNDROME: ICD-10-CM

## 2024-11-26 DIAGNOSIS — E66.01 SEVERE OBESITY (BMI 35.0-39.9) WITH COMORBIDITY: ICD-10-CM

## 2024-11-26 PROCEDURE — G8484 FLU IMMUNIZE NO ADMIN: HCPCS | Performed by: INTERNAL MEDICINE

## 2024-11-26 PROCEDURE — 99214 OFFICE O/P EST MOD 30 MIN: CPT | Performed by: INTERNAL MEDICINE

## 2024-11-26 PROCEDURE — 3077F SYST BP >= 140 MM HG: CPT | Performed by: INTERNAL MEDICINE

## 2024-11-26 PROCEDURE — 3023F SPIROM DOC REV: CPT | Performed by: INTERNAL MEDICINE

## 2024-11-26 PROCEDURE — 3078F DIAST BP <80 MM HG: CPT | Performed by: INTERNAL MEDICINE

## 2024-11-26 PROCEDURE — G8427 DOCREV CUR MEDS BY ELIG CLIN: HCPCS | Performed by: INTERNAL MEDICINE

## 2024-11-26 PROCEDURE — 1036F TOBACCO NON-USER: CPT | Performed by: INTERNAL MEDICINE

## 2024-11-26 PROCEDURE — 1159F MED LIST DOCD IN RCRD: CPT | Performed by: INTERNAL MEDICINE

## 2024-11-26 PROCEDURE — 1123F ACP DISCUSS/DSCN MKR DOCD: CPT | Performed by: INTERNAL MEDICINE

## 2024-11-26 PROCEDURE — G8417 CALC BMI ABV UP PARAM F/U: HCPCS | Performed by: INTERNAL MEDICINE

## 2024-11-26 RX ORDER — ALBUTEROL SULFATE 0.83 MG/ML
2.5 SOLUTION RESPIRATORY (INHALATION) 4 TIMES DAILY
Qty: 360 ML | Refills: 5 | Status: SHIPPED | OUTPATIENT
Start: 2024-11-26 | End: 2024-12-26

## 2024-11-26 RX ORDER — BUDESONIDE 0.5 MG/2ML
500 INHALANT ORAL 2 TIMES DAILY
Qty: 60 EACH | Refills: 5 | Status: SHIPPED | OUTPATIENT
Start: 2024-11-26

## 2024-11-26 NOTE — PROGRESS NOTES
PULMONARY OP  PROGRESS NOTE      Patient:  John Burgess  YOB: 1940    MRN: [id number]     Acct:        Pt seen and Chart reviewed.  John Burgess is here in followup for   1. Chronic obstructive pulmonary disease, unspecified COPD type (HCC)    2. Severe obesity (BMI 35.0-39.9) with comorbidity    3. Obstructive sleep apnea syndrome    4. Mild persistent asthma without complication    5. Chronic diastolic (congestive) heart failure (HCC)    6. Coronary artery disease involving native coronary artery of native heart without angina pectoris          History of Present Illness  The patient is an 84-year-old male who presents for evaluation of COPD, severe obesity, obstructive sleep apnea syndrome, mild persistent asthma without complication, chronic diastolic congestive heart failure, and coronary artery disease. He is accompanied by his son.    Since his last visit, he has not required hospitalization or emergency room visits due to respiratory issues. His breathing is stable, with no increase in shortness of breath or wheezing. He experienced flu-like symptoms and a cough last week. He has not received his influenza vaccine for the current season. He has a history of double pneumonia.    His sleep is generally good, aided by nightly use of a CPAP machine. He reports no daytime sleepiness, fatigue, or tiredness. There is no nasal bleeding or morning sore throat. He occasionally wakes up at night to refill his water. He does not experience difficulty lying flat or waking up short of breath.     He uses an albuterol nebulizer and is currently taking budesonide 0.5 mg twice daily and albuterol as needed. He requires refills for these medications.     He recently acquired orthotic shoes due to mild neuropathy. He has been attempting to lose weight.         Review of Systems -   Constitutional ROS: negative  ENT ROS: negative  Allergy and Immunology ROS: negative  Respiratory ROS: negative  Cardiovascular ROS:

## 2024-12-22 ENCOUNTER — PATIENT MESSAGE (OUTPATIENT)
Dept: PRIMARY CARE CLINIC | Age: 84
End: 2024-12-22

## 2024-12-23 DIAGNOSIS — E03.9 HYPOTHYROIDISM, UNSPECIFIED TYPE: Primary | ICD-10-CM

## 2024-12-31 DIAGNOSIS — E03.9 HYPOTHYROIDISM, UNSPECIFIED TYPE: ICD-10-CM

## 2024-12-31 LAB
T4 FREE: 1 NG/DL (ref 0.92–1.68)
TSH SERPL DL<=0.05 MIU/L-ACNC: 6.18 UIU/ML (ref 0.27–4.2)

## 2025-01-12 NOTE — PROGRESS NOTES
[facility]  [facility]  [address]  [place] [address]  Dept: [phone]    John Burgess is a 84 y.o. male who presents today for his medical conditions/complaints as noted below.      Chief Complaint   Patient presents with    Diabetes     Patient is here for a diabetic f/u - patient is not checking blood sugar        HPI:     HPI  Sugars: not checking   No hypoglycemia    TSH is elevated. Feels tired a lot    Podiatry:  ordered Orthotic shoes     Reviewed cardiac and pulmo notes  No components found for: \"LDLCHOLESTEROL\", \"LDLCALC\"    (goal LDL is <100)   AST (U/L)   Date Value   10/10/2024 25     ALT (U/L)   Date Value   10/02/2023 20     BUN (mg/dL)   Date Value   08/19/2021 15     BP Readings from Last 3 Encounters:   01/13/25 130/78   11/26/24 (!) 154/86   10/07/24 120/78          (goal 120/80)    Past Medical History:   Diagnosis Date    Depression     Hyperlipidemia     Hypertension     Hypothyroidism       Past Surgical History:   Procedure Laterality Date    CARDIAC CATHETERIZATION  09/23/2021    CORONARY ANGIOPLASTY WITH STENT PLACEMENT  2013    EYE SURGERY      as a child       Family History   Problem Relation Age of Onset    Cancer Mother         breast    Diabetes Brother     Heart Disease Brother     High Blood Pressure Brother     Diabetes Maternal Uncle     Heart Disease Maternal Uncle     Cancer Other         throat       Social History     Tobacco Use    Smoking status: Never     Passive exposure: Yes    Smokeless tobacco: Never   Substance Use Topics    Alcohol use: No      Current Outpatient Medications   Medication Sig Dispense Refill    aspirin 81 MG chewable tablet CHEW AND SWALLOW 1 TABLET BY MOUTH EVERY DAY 90 tablet 2    amLODIPine (NORVASC) 10 MG tablet TAKE 1 TABLET BY MOUTH EVERY DAY 90 tablet 2    levothyroxine (SYNTHROID) 150 MCG tablet Take 1 tablet by mouth daily 30 tablet 5    Continuous Glucose Sensor (DEXCOM G6 SENSOR) MISC

## 2025-01-13 ENCOUNTER — OFFICE VISIT (OUTPATIENT)
Dept: PRIMARY CARE CLINIC | Age: 85
End: 2025-01-13

## 2025-01-13 VITALS
DIASTOLIC BLOOD PRESSURE: 78 MMHG | HEART RATE: 70 BPM | WEIGHT: 246 LBS | SYSTOLIC BLOOD PRESSURE: 130 MMHG | HEIGHT: 68 IN | BODY MASS INDEX: 37.28 KG/M2

## 2025-01-13 DIAGNOSIS — E03.9 HYPOTHYROIDISM, UNSPECIFIED TYPE: ICD-10-CM

## 2025-01-13 DIAGNOSIS — I50.32 CHRONIC DIASTOLIC (CONGESTIVE) HEART FAILURE (HCC): ICD-10-CM

## 2025-01-13 DIAGNOSIS — E11.40 TYPE 2 DIABETES MELLITUS WITH DIABETIC NEUROPATHY, WITHOUT LONG-TERM CURRENT USE OF INSULIN (HCC): Primary | ICD-10-CM

## 2025-01-13 DIAGNOSIS — J44.9 CHRONIC OBSTRUCTIVE PULMONARY DISEASE, UNSPECIFIED COPD TYPE (HCC): ICD-10-CM

## 2025-01-13 DIAGNOSIS — Z23 NEED FOR VACCINATION: ICD-10-CM

## 2025-01-13 DIAGNOSIS — S32.000S CLOSED COMPRESSION FRACTURE OF LUMBOSACRAL SPINE, SEQUELA: ICD-10-CM

## 2025-01-13 DIAGNOSIS — G89.29 CHRONIC BILATERAL LOW BACK PAIN WITHOUT SCIATICA: ICD-10-CM

## 2025-01-13 DIAGNOSIS — M54.50 CHRONIC BILATERAL LOW BACK PAIN WITHOUT SCIATICA: ICD-10-CM

## 2025-01-13 LAB — HBA1C MFR BLD: 6.2 %

## 2025-01-13 RX ORDER — PROCHLORPERAZINE 25 MG/1
SUPPOSITORY RECTAL
Qty: 1 EACH | Refills: 0 | Status: SHIPPED | OUTPATIENT
Start: 2025-01-13

## 2025-01-13 RX ORDER — LEVOTHYROXINE SODIUM 150 UG/1
150 TABLET ORAL DAILY
Qty: 30 TABLET | Refills: 5 | Status: SHIPPED | OUTPATIENT
Start: 2025-01-13

## 2025-01-13 RX ORDER — PROCHLORPERAZINE 25 MG/1
SUPPOSITORY RECTAL
Qty: 3 EACH | Refills: 3 | Status: SHIPPED | OUTPATIENT
Start: 2025-01-13

## 2025-01-13 RX ORDER — PROCHLORPERAZINE 25 MG/1
SUPPOSITORY RECTAL
Qty: 1 EACH | Refills: 3 | Status: SHIPPED | OUTPATIENT
Start: 2025-01-13

## 2025-01-13 SDOH — ECONOMIC STABILITY: FOOD INSECURITY: WITHIN THE PAST 12 MONTHS, THE FOOD YOU BOUGHT JUST DIDN'T LAST AND YOU DIDN'T HAVE MONEY TO GET MORE.: NEVER TRUE

## 2025-01-13 SDOH — ECONOMIC STABILITY: FOOD INSECURITY: WITHIN THE PAST 12 MONTHS, YOU WORRIED THAT YOUR FOOD WOULD RUN OUT BEFORE YOU GOT MONEY TO BUY MORE.: NEVER TRUE

## 2025-01-13 ASSESSMENT — PATIENT HEALTH QUESTIONNAIRE - PHQ9
2. FEELING DOWN, DEPRESSED OR HOPELESS: NOT AT ALL
4. FEELING TIRED OR HAVING LITTLE ENERGY: NOT AT ALL
1. LITTLE INTEREST OR PLEASURE IN DOING THINGS: NOT AT ALL
8. MOVING OR SPEAKING SO SLOWLY THAT OTHER PEOPLE COULD HAVE NOTICED. OR THE OPPOSITE, BEING SO FIGETY OR RESTLESS THAT YOU HAVE BEEN MOVING AROUND A LOT MORE THAN USUAL: NOT AT ALL
3. TROUBLE FALLING OR STAYING ASLEEP: NOT AT ALL
10. IF YOU CHECKED OFF ANY PROBLEMS, HOW DIFFICULT HAVE THESE PROBLEMS MADE IT FOR YOU TO DO YOUR WORK, TAKE CARE OF THINGS AT HOME, OR GET ALONG WITH OTHER PEOPLE: NOT DIFFICULT AT ALL
SUM OF ALL RESPONSES TO PHQ9 QUESTIONS 1 & 2: 0
6. FEELING BAD ABOUT YOURSELF - OR THAT YOU ARE A FAILURE OR HAVE LET YOURSELF OR YOUR FAMILY DOWN: NOT AT ALL
SUM OF ALL RESPONSES TO PHQ QUESTIONS 1-9: 0
9. THOUGHTS THAT YOU WOULD BE BETTER OFF DEAD, OR OF HURTING YOURSELF: NOT AT ALL
5. POOR APPETITE OR OVEREATING: NOT AT ALL
SUM OF ALL RESPONSES TO PHQ QUESTIONS 1-9: 0
SUM OF ALL RESPONSES TO PHQ QUESTIONS 1-9: 0
7. TROUBLE CONCENTRATING ON THINGS, SUCH AS READING THE NEWSPAPER OR WATCHING TELEVISION: NOT AT ALL
SUM OF ALL RESPONSES TO PHQ QUESTIONS 1-9: 0

## 2025-01-22 ENCOUNTER — TELEPHONE (OUTPATIENT)
Dept: PRIMARY CARE CLINIC | Age: 85
End: 2025-01-22

## 2025-01-23 NOTE — TELEPHONE ENCOUNTER
Please let his son in law know that Dexcom is not covered then.    Report and handoff, bedside, to Connie RN.  Right anterior chest with dressing CDI.  Pt without dyspnea, SOB or CP.  RN without questions or concerns and asked to call for any.     Steve Acosta RN  Interventional Radiology-[place]

## 2025-01-23 NOTE — TELEPHONE ENCOUNTER
Left a voicemail with son-in-law (HIPAA) stating patient is not eligible for Dexcom due to not being on insulin

## 2025-01-27 RX ORDER — AZITHROMYCIN 250 MG/1
TABLET, FILM COATED ORAL
Qty: 1 PACKET | Refills: 0 | Status: SHIPPED | OUTPATIENT
Start: 2025-01-27 | End: 2025-02-06

## 2025-01-27 RX ORDER — BENZONATATE 100 MG/1
100 CAPSULE ORAL 3 TIMES DAILY PRN
Qty: 30 CAPSULE | Refills: 0 | Status: SHIPPED | OUTPATIENT
Start: 2025-01-27 | End: 2025-02-06

## 2025-01-29 DIAGNOSIS — Z09 ENCOUNTER FOR FOLLOW-UP EXAMINATION AFTER COMPLETED TREATMENT FOR CONDITIONS OTHER THAN MALIGNANT NEOPLASM: ICD-10-CM

## 2025-01-29 DIAGNOSIS — Z13.820 SCREENING FOR OSTEOPOROSIS: Primary | ICD-10-CM

## 2025-02-19 ENCOUNTER — HOSPITAL ENCOUNTER (OUTPATIENT)
Age: 85
Discharge: HOME | End: 2025-02-21
Payer: MEDICARE

## 2025-02-19 ENCOUNTER — HOSPITAL ENCOUNTER (OUTPATIENT)
Dept: GENERAL RADIOLOGY | Age: 85
Discharge: HOME | End: 2025-02-21
Payer: MEDICARE

## 2025-02-19 DIAGNOSIS — Z09 ENCOUNTER FOR FOLLOW-UP EXAMINATION AFTER COMPLETED TREATMENT FOR CONDITIONS OTHER THAN MALIGNANT NEOPLASM: ICD-10-CM

## 2025-02-19 DIAGNOSIS — Z13.820 SCREENING FOR OSTEOPOROSIS: ICD-10-CM

## 2025-02-19 DIAGNOSIS — M54.50 CHRONIC BILATERAL LOW BACK PAIN WITHOUT SCIATICA: ICD-10-CM

## 2025-02-19 DIAGNOSIS — G89.29 CHRONIC BILATERAL LOW BACK PAIN WITHOUT SCIATICA: ICD-10-CM

## 2025-02-19 PROCEDURE — 72110 X-RAY EXAM L-2 SPINE 4/>VWS: CPT

## 2025-02-19 PROCEDURE — 77080 DXA BONE DENSITY AXIAL: CPT

## 2025-02-20 NOTE — RESULT ENCOUNTER NOTE
Please advise that Dexa scan shows some minimal bone loss so make sure to take Vit D 1000U daily and get regular weight bearing exercise daily

## 2025-03-20 DIAGNOSIS — E03.9 HYPOTHYROIDISM, UNSPECIFIED TYPE: ICD-10-CM

## 2025-03-20 LAB
T4 FREE: 1.3 NG/DL (ref 0.92–1.68)
TSH SERPL DL<=0.05 MIU/L-ACNC: 1.34 UIU/ML (ref 0.27–4.2)

## 2025-03-21 ENCOUNTER — RESULTS FOLLOW-UP (OUTPATIENT)
Dept: PRIMARY CARE CLINIC | Age: 85
End: 2025-03-21

## 2025-04-11 DIAGNOSIS — N13.8 BPH WITH OBSTRUCTION/LOWER URINARY TRACT SYMPTOMS: ICD-10-CM

## 2025-04-11 DIAGNOSIS — N40.1 BPH WITH OBSTRUCTION/LOWER URINARY TRACT SYMPTOMS: ICD-10-CM

## 2025-04-14 RX ORDER — TAMSULOSIN HYDROCHLORIDE 0.4 MG/1
CAPSULE ORAL
Qty: 90 CAPSULE | Refills: 3 | Status: SHIPPED | OUTPATIENT
Start: 2025-04-14

## 2025-06-03 ENCOUNTER — OFFICE VISIT (OUTPATIENT)
Dept: PULMONOLOGY | Age: 85
End: 2025-06-03
Payer: MEDICARE

## 2025-06-03 VITALS
WEIGHT: 234 LBS | SYSTOLIC BLOOD PRESSURE: 144 MMHG | BODY MASS INDEX: 35.46 KG/M2 | OXYGEN SATURATION: 93 % | RESPIRATION RATE: 18 BRPM | HEIGHT: 68 IN | DIASTOLIC BLOOD PRESSURE: 78 MMHG | HEART RATE: 63 BPM

## 2025-06-03 DIAGNOSIS — E66.01 SEVERE OBESITY (BMI 35.0-39.9) WITH COMORBIDITY (HCC): ICD-10-CM

## 2025-06-03 DIAGNOSIS — I50.32 CHRONIC DIASTOLIC (CONGESTIVE) HEART FAILURE (HCC): ICD-10-CM

## 2025-06-03 DIAGNOSIS — J44.9 CHRONIC OBSTRUCTIVE PULMONARY DISEASE, UNSPECIFIED COPD TYPE (HCC): Primary | ICD-10-CM

## 2025-06-03 DIAGNOSIS — J45.30 MILD PERSISTENT ASTHMA WITHOUT COMPLICATION: ICD-10-CM

## 2025-06-03 DIAGNOSIS — I25.10 CORONARY ARTERY DISEASE INVOLVING NATIVE CORONARY ARTERY OF NATIVE HEART WITHOUT ANGINA PECTORIS: ICD-10-CM

## 2025-06-03 DIAGNOSIS — G47.33 OBSTRUCTIVE SLEEP APNEA SYNDROME: ICD-10-CM

## 2025-06-03 PROCEDURE — G8417 CALC BMI ABV UP PARAM F/U: HCPCS | Performed by: INTERNAL MEDICINE

## 2025-06-03 PROCEDURE — 1036F TOBACCO NON-USER: CPT | Performed by: INTERNAL MEDICINE

## 2025-06-03 PROCEDURE — 1159F MED LIST DOCD IN RCRD: CPT | Performed by: INTERNAL MEDICINE

## 2025-06-03 PROCEDURE — 99214 OFFICE O/P EST MOD 30 MIN: CPT | Performed by: INTERNAL MEDICINE

## 2025-06-03 PROCEDURE — 3078F DIAST BP <80 MM HG: CPT | Performed by: INTERNAL MEDICINE

## 2025-06-03 PROCEDURE — 3077F SYST BP >= 140 MM HG: CPT | Performed by: INTERNAL MEDICINE

## 2025-06-03 PROCEDURE — 1123F ACP DISCUSS/DSCN MKR DOCD: CPT | Performed by: INTERNAL MEDICINE

## 2025-06-03 PROCEDURE — 3023F SPIROM DOC REV: CPT | Performed by: INTERNAL MEDICINE

## 2025-06-03 PROCEDURE — G8427 DOCREV CUR MEDS BY ELIG CLIN: HCPCS | Performed by: INTERNAL MEDICINE

## 2025-06-03 RX ORDER — BUDESONIDE 0.5 MG/2ML
500 INHALANT ORAL 2 TIMES DAILY
Qty: 60 EACH | Refills: 5 | Status: SHIPPED | OUTPATIENT
Start: 2025-06-03

## 2025-06-03 NOTE — PROGRESS NOTES
PULMONARY OP  PROGRESS NOTE      Patient:  John Burgess  YOB: 1940    MRN: [id number]     Acct:        Pt seen and Chart reviewed.  John Burgess is here in followup for   1. Chronic obstructive pulmonary disease, unspecified COPD type (HCC)    2. Severe obesity (BMI 35.0-39.9) with comorbidity (HCC)    3. Obstructive sleep apnea syndrome    4. Mild persistent asthma without complication    5. Chronic diastolic (congestive) heart failure (HCC)    6. Coronary artery disease involving native coronary artery of native heart without angina pectoris          History of Present Illness  The patient is an 85-year-old male who presents for follow-up of COPD, severe obesity, obstructive sleep apnea syndrome, mild persistent asthma, chronic diastolic congestive heart failure, and coronary artery disease.    Since his last visit in November 2024, he has not required hospitalization or emergency room visits due to COPD. He reports no exacerbation of shortness of breath or wheezing. He occasionally experiences a productive cough but has no hemoptysis. He also reports no systemic symptoms such as weight loss, anorexia, fever, chills, or night sweats. He does not experience orthopnea or peripheral edema. He is currently on albuterol nebulizer and Pulmicort nebulizer twice daily.    He has been compliant with his BiPAP therapy, with no reported issues of epistaxis or morning pharyngitis. His daytime well-being is generally good, although he acknowledges occasional off days.    He reports no angina symptoms during physical exertion.    He has found relief from orthopedic shoes, which have increased his physical activity.    MEDICATIONS  Current: Albuterol nebulizer, Pulmicort nebulizer.         Review of Systems -   Constitutional ROS: negative  ENT ROS: negative  Allergy and Immunology ROS: negative  Respiratory ROS: negative  Cardiovascular ROS: negative  Gastrointestinal ROS: negative  Musculoskeletal ROS: negative

## 2025-06-03 NOTE — PATIENT INSTRUCTIONS
If your provider ordered CPAP/ BiPAP or DME supplies for you the order will be faxed to your DME supplier once the office visit note is complete. Please follow up with them for your supplies. If you do not have a DME supplier staff will fax the order to [facility], unless you would like to use another DME company. [facility] (p) [phone]    If any CT scans or Pulmonary Function Testing was ordered during today's visit, please contact [facility] Scheduling at [phone] to arrange scheduling that testing.     If any labs/blood work was ordered during today's visit, please be sure to complete that at least 1 week prior to your next appointment to allow adequate time for resulting.     You may receive a survey requesting feedback regarding your care today. If you could please take a few minutes to fill that out, our office greatly appreciates all feedback.     The provider you seen during todays visit was Dr. Edmond  Your Medical Assistant was Shaneka. At the end of your visit, you were checked out by Shaneka.     Thank you again for your time & thank you for choosing [facility] for your care!

## 2025-07-30 DIAGNOSIS — E03.9 HYPOTHYROIDISM, UNSPECIFIED TYPE: ICD-10-CM

## 2025-07-30 RX ORDER — LEVOTHYROXINE SODIUM 150 UG/1
150 TABLET ORAL DAILY
Qty: 30 TABLET | Refills: 2 | Status: SHIPPED | OUTPATIENT
Start: 2025-07-30

## 2025-08-24 DIAGNOSIS — E11.9 TYPE 2 DIABETES MELLITUS WITHOUT COMPLICATION, WITHOUT LONG-TERM CURRENT USE OF INSULIN (HCC): ICD-10-CM
